# Patient Record
Sex: MALE | Race: WHITE | NOT HISPANIC OR LATINO | Employment: OTHER | URBAN - METROPOLITAN AREA
[De-identification: names, ages, dates, MRNs, and addresses within clinical notes are randomized per-mention and may not be internally consistent; named-entity substitution may affect disease eponyms.]

---

## 2019-10-14 PROBLEM — F17.200 TOBACCO DEPENDENCE: Status: ACTIVE | Noted: 2019-10-14

## 2019-10-14 PROBLEM — I10 HYPERTENSION: Status: ACTIVE | Noted: 2019-10-14

## 2019-10-14 PROBLEM — R11.10 NON-INTRACTABLE VOMITING: Status: ACTIVE | Noted: 2019-10-14

## 2019-10-14 PROBLEM — G44.89 OTHER HEADACHE SYNDROME: Status: ACTIVE | Noted: 2019-10-14

## 2019-10-14 PROBLEM — I10 HTN (HYPERTENSION), MALIGNANT: Status: ACTIVE | Noted: 2019-10-14

## 2019-10-15 PROBLEM — I16.0 HYPERTENSIVE URGENCY: Status: ACTIVE | Noted: 2019-10-14

## 2019-10-15 PROBLEM — R51.9 NONINTRACTABLE HEADACHE: Status: ACTIVE | Noted: 2019-10-15

## 2019-11-01 PROBLEM — R55 SYNCOPE: Status: ACTIVE | Noted: 2019-11-01

## 2021-04-16 ENCOUNTER — PATIENT MESSAGE (OUTPATIENT)
Dept: RESEARCH | Facility: HOSPITAL | Age: 61
End: 2021-04-16

## 2021-10-14 ENCOUNTER — IMMUNIZATION (OUTPATIENT)
Dept: FAMILY MEDICINE | Facility: CLINIC | Age: 61
End: 2021-10-14
Payer: MEDICAID

## 2021-10-14 DIAGNOSIS — Z23 NEED FOR VACCINATION: Primary | ICD-10-CM

## 2021-10-14 PROCEDURE — 0003A COVID-19, MRNA, LNP-S, PF, 30 MCG/0.3 ML DOSE VACCINE: CPT | Mod: PBBFAC | Performed by: FAMILY MEDICINE

## 2021-10-14 PROCEDURE — 91300 COVID-19, MRNA, LNP-S, PF, 30 MCG/0.3 ML DOSE VACCINE: CPT | Mod: PBBFAC,PO

## 2023-04-25 ENCOUNTER — CLINICAL SUPPORT (OUTPATIENT)
Dept: SMOKING CESSATION | Facility: CLINIC | Age: 63
End: 2023-04-25
Payer: COMMERCIAL

## 2023-04-25 DIAGNOSIS — F17.210 MODERATE SMOKER (20 OR LESS PER DAY): Primary | ICD-10-CM

## 2023-04-25 DIAGNOSIS — F17.200 NEEDS SMOKING CESSATION EDUCATION: ICD-10-CM

## 2023-04-25 PROCEDURE — 99404 PREV MED CNSL INDIV APPRX 60: CPT | Mod: S$GLB,,, | Performed by: INTERNAL MEDICINE

## 2023-04-25 PROCEDURE — 99404 PR PREVENT COUNSEL,INDIV,60 MIN: ICD-10-PCS | Mod: S$GLB,,, | Performed by: INTERNAL MEDICINE

## 2023-04-25 RX ORDER — VARENICLINE TARTRATE 1 MG/1
1 TABLET, FILM COATED ORAL 2 TIMES DAILY
Qty: 56 TABLET | Refills: 0 | Status: SHIPPED | OUTPATIENT
Start: 2023-04-25 | End: 2023-05-16 | Stop reason: SDUPTHER

## 2023-04-25 NOTE — Clinical Note
Patient seen for the tobacco cessation program. This is him first attempt to stop smoking through our program. He and his spouse, Ting are trying to stop smoking together. He is a cigarette smoker of 1 PPD X 45 years. He is motivated to quit the use of tobacco and has agreed to attend our 6 week tobacco cessation program.

## 2023-05-02 ENCOUNTER — CLINICAL SUPPORT (OUTPATIENT)
Dept: SMOKING CESSATION | Facility: CLINIC | Age: 63
End: 2023-05-02

## 2023-05-02 DIAGNOSIS — F17.210 MODERATE SMOKER (20 OR LESS PER DAY): Primary | ICD-10-CM

## 2023-05-02 PROCEDURE — 90853 GROUP PSYCHOTHERAPY: CPT | Mod: S$GLB,,, | Performed by: INTERNAL MEDICINE

## 2023-05-02 PROCEDURE — 90853 PR GROUP PSYCHOTHERAPY: ICD-10-PCS | Mod: S$GLB,,, | Performed by: INTERNAL MEDICINE

## 2023-05-03 NOTE — PROGRESS NOTES
Site: [facility]  Date:  5/3/2023  Clinical Status of Patient: Outpatient   Length of Service and Code: 60 minutes - 56782   Number in Attendance: 5  Group Activities/Focus of Group:  #3 orientation, client introductions, completion of TCRS (Tobacco Cessation Rating Scale) learned addiction model, cues/triggers, personal reasons for quitting, medications, goals, quit date    Target symptoms:  withdrawal and medication side effects             The following were rated moderate (3) to severe (4) on TCRS:       Moderate 3: none     Severe 4:   none  Patient's Response to Intervention: The patient denies any abnormal behavioral or mental changes at this time.     Progress Toward Goals and Other Mental Status Changes: Patient seen with his spouse, Ting for our group class. They are trying to stop smoking together. We discussed and reviewed strategies, controlling environment, cues, triggers, new goals set. Introduced high risk situations with preparation interventions, caffeine similarities with withdrawal issues of habit and nicotine, alcohol, understanding urges, cravings, stress and relaxation. Open discussion with intervention discussion. The patient remains on the prescribed tobacco cessation medication regimen of 1 mg Chantix BID without any negative side effects at this time. The patient denies any abnormal behavioral or mental changes at this time. The patient will continue with group therapy sessions and medication monitoring by CTTS. Prescribed medication management will be by physician.     Diagnosis: F17.210    Plan: The patient will continue with therapy sessions and medication regimen prescribed with management by physician or Cessation Clinic Provider. Patient will inform Smoking Clinic Cessation Counselor of symptoms as rated high on TCRS.    Return to Clinic: 1 week

## 2023-05-09 ENCOUNTER — CLINICAL SUPPORT (OUTPATIENT)
Dept: SMOKING CESSATION | Facility: CLINIC | Age: 63
End: 2023-05-09

## 2023-05-09 DIAGNOSIS — F17.210 LIGHT CIGARETTE SMOKER (1-9 CIGS/DAY): Primary | ICD-10-CM

## 2023-05-09 PROCEDURE — 90853 PR GROUP PSYCHOTHERAPY: ICD-10-PCS | Mod: S$GLB,,, | Performed by: INTERNAL MEDICINE

## 2023-05-09 PROCEDURE — 90853 GROUP PSYCHOTHERAPY: CPT | Mod: S$GLB,,, | Performed by: INTERNAL MEDICINE

## 2023-05-09 NOTE — Clinical Note
Patient reports decreasing cigarette smoking from a 1/2 pack per for 45 years to 5 cigarettes per day for 45 years to  He and his spouse, Ting are trying tot stop smoking together. We discussed and reviewed strategies, habitual behavior, high risks situations, understanding urges and cravings, stress and relaxation with open discussion and additional interventions, Introduced lapses, relapses, understanding them and analyzing the situation of a lapse, conflict issues that may be linked to a lapse.The patient remains on the prescribed tobacco cessation medication regimen of 1 mg Chantix BID without any negative side effects at this time. The patient denies any abnormal behavioral or mental changes at this time. The patient will continue with group therapy sessions and medication monitoring by CTTS. Prescribed medication management will be by physician.

## 2023-05-09 NOTE — PROGRESS NOTES
Site: [facility]  Date:  5/9/2023  Clinical Status of Patient: Outpatient   Length of Service and Code: 90 minutes - 59929   Number in Attendance: 3  CO Monitor Score: 00 pmm  Group Activities/Focus of Group:  orientation, client introductions, completion of TCRS (Tobacco Cessation Rating Scale) learned addiction model, cues/triggers, personal reasons for quitting, medications, goals, quit date    Target symptoms:  withdrawal and medication side effects             The following were rated moderate (3) to severe (4) on TCRS:       Moderate 3: none     Severe 4:   none  Patient's Response to Intervention: #5 Patient reports decreasing cigarette smoking from a 1/2 pack per for 45 years to 5 cigarettes per day for 45 years to  He and his spouse, Ting are trying tot stop smoking together. We discussed and reviewed strategies, habitual behavior, high risks situations, understanding urges and cravings, stress and relaxation with open discussion and additional interventions, Introduced lapses, relapses, understanding them and analyzing the situation of a lapse, conflict issues that may be linked to a lapse.The patient remains on the prescribed tobacco cessation medication regimen of 1 mg Chantix BID without any negative side effects at this time. The patient denies any abnormal behavioral or mental changes at this time. The patient will continue with group therapy sessions and medication monitoring by CTTS. Prescribed medication management will be by physician.     Progress Toward Goals and Other Mental Status Changes: The patient denies any abnormal behavioral or mental changes at this time.     Diagnosis: f17.210    Plan: The patient will continue with group therapy sessions and medication regimen prescribed with management by physician or Cessation Clinic Provider. Patient will inform Smoking Clinic Cessation Counselor of symptoms as rated high on TCRS.    Return to Clinic: 1 week

## 2023-05-16 ENCOUNTER — CLINICAL SUPPORT (OUTPATIENT)
Dept: SMOKING CESSATION | Facility: CLINIC | Age: 63
End: 2023-05-16

## 2023-05-16 DIAGNOSIS — F17.210 MODERATE SMOKER (20 OR LESS PER DAY): ICD-10-CM

## 2023-05-16 DIAGNOSIS — F17.210 CIGARETTE NICOTINE DEPENDENCE, UNCOMPLICATED: Primary | ICD-10-CM

## 2023-05-16 PROCEDURE — 90853 GROUP PSYCHOTHERAPY: CPT | Mod: S$GLB,,, | Performed by: INTERNAL MEDICINE

## 2023-05-16 PROCEDURE — 90853 PR GROUP PSYCHOTHERAPY: ICD-10-PCS | Mod: S$GLB,,, | Performed by: INTERNAL MEDICINE

## 2023-05-16 RX ORDER — VARENICLINE TARTRATE 1 MG/1
1 TABLET, FILM COATED ORAL 2 TIMES DAILY
Qty: 56 TABLET | Refills: 0 | Status: SHIPPED | OUTPATIENT
Start: 2023-05-16 | End: 2024-01-29

## 2023-05-16 NOTE — PROGRESS NOTES
Site: [facility]  Date:  5/16/2023  Clinical Status of Patient: Outpatient   Length of Service and Code: 60 minutes - 02319   Number in Attendance: 6  CO Monitor Score: 00 pmm  Group Activities/Focus of Group:  Orientation, client introductions, completion of TCRS (Tobacco Cessation Rating Scale) learned addiction model, cues/triggers, personal reasons for quitting, medications, goals, quit date.    Target symptoms:  withdrawal and medication side effects             The following were rated moderate (3) to severe (4) on TCRS:       Moderate 3: none     Severe 4:   none  Patient's Response to Intervention: #4 Patient reports being tobacco free since yesterday morning, May 15,2023. Patient and his spouse, Ting attended group meeting together in an attempt to stop smoking. His wife is a candidate for a kidney replacement and he will be her donor. We discussed and reviewed strategies, habitual behavior, stress, and high risk situations. Introduced stress with addition interventions, SOLVE, relaxation with interventions, nutrition, exercise, weight gain, and the importance of rewarding oneself for accomplishments toward becoming tobacco free. Open discussion of all items with interventions. The patient remains on the prescribed tobacco cessation medication regimen of 1 mg Chantix BID without any negative side effects at this time. The patient denies any abnormal behavioral or mental changes at this time. The patient will continue with group therapy sessions and medication monitoring by CTTS. Prescribed medication management will be by physician.     Progress Toward Goals and Other Mental Status Changes: The patient denies any abnormal behavioral or mental changes at this time.     Diagnosis: F17.210    Plan: The patient will continue with group therapy sessions and medication regimen prescribed with management by physician or Cessation Clinic Provider. Patient will inform Smoking Clinic Cessation Counselor of  symptoms as rated high on TCRS.    Return to Clinic: 1 week

## 2023-05-16 NOTE — Clinical Note
Patient reports being tobacco free since yesterday morning, May 15,2023. Patient and his spouse, Ting attended group meeting together in an attempt to stop smoking. His wife is a candidate for a kidney replacement and he will be her donor. We discussed and reviewed strategies, habitual behavior, stress, and high risk situations. Introduced stress with addition interventions, SOLVE, relaxation with interventions, nutrition, exercise, weight gain, and the importance of rewarding oneself for accomplishments toward becoming tobacco free. Open discussion of all items with interventions. The patient remains on the prescribed tobacco cessation medication regimen of 1 mg Chantix BID without any negative side effects at this time. The patient denies any abnormal behavioral or mental changes at this time. The patient will continue with group therapy sessions and medication monitoring by CTTS. Prescribed medication management will be by physician.

## 2023-05-23 ENCOUNTER — CLINICAL SUPPORT (OUTPATIENT)
Dept: SMOKING CESSATION | Facility: CLINIC | Age: 63
End: 2023-05-23

## 2023-05-23 DIAGNOSIS — F17.210 CIGARETTE NICOTINE DEPENDENCE, UNCOMPLICATED: Primary | ICD-10-CM

## 2023-05-23 PROCEDURE — 90853 GROUP PSYCHOTHERAPY: CPT | Mod: S$GLB,,, | Performed by: INTERNAL MEDICINE

## 2023-05-23 PROCEDURE — 90853 PR GROUP PSYCHOTHERAPY: ICD-10-PCS | Mod: S$GLB,,, | Performed by: INTERNAL MEDICINE

## 2023-05-23 NOTE — Clinical Note
Patient seen with his spouse, Ting at his side for smoking cessation. They have stopped smoking together. His quit date is 5/15/23. He has a history of smoking 1/2 a pack of cigarettes per day for 45 years. We discussed and reviewed strategies, habitual behavior, high risks situations, understanding urges and cravings, stress and relaxation with open discussion and additional interventions, Introduced lapses, relapses, understanding them and analyzing the situation of a lapse, conflict issues that may be linked to a lapse. The patient remains on the prescribed tobacco cessation medication regimen of 1 mg Chantix BID without any negative side effects at this time. The patient denies any abnormal behavioral or mental changes at this time. The patient will continue with group therapy sessions and medication monitoring by CTTS. Prescribed medication management will be by physician.

## 2023-05-23 NOTE — PROGRESS NOTES
Site: [facility]  Date:  5/23/2023  Clinical Status of Patient: Outpatient   Length of Service and Code: 60 minutes - 40204   Number in Attendance: 2  CO Monitor Score: 1 pmm  Group Activities/Focus of Group:  Orientation, client introductions, completion of TCRS (Tobacco Cessation Rating Scale) learned addiction model, cues/triggers, personal reasons for quitting, medications, goals, quit date.    Target symptoms:  withdrawal and medication side effects             The following were rated moderate (3) to severe (4) on TCRS:       Moderate 3: none     Severe 4:   none  Patient's Response to Intervention: #5 Patient seen with his spouse, Ting at his side for smoking cessation. They have stopped smoking together. His quit date is 5/15/23. He has a history of smoking 1/2 a pack of cigarettes per day for 45 years. We discussed and reviewed strategies, habitual behavior, high risks situations, understanding urges and cravings, stress and relaxation with open discussion and additional interventions, Introduced lapses, relapses, understanding them and analyzing the situation of a lapse, conflict issues that may be linked to a lapse. The patient remains on the prescribed tobacco cessation medication regimen of 1 mg Chantix BID without any negative side effects at this time. The patient denies any abnormal behavioral or mental changes at this time. The patient will continue with group therapy sessions and medication monitoring by CTTS. Prescribed medication management will be by physician.     Progress Toward Goals and Other Mental Status Changes: The patient denies any abnormal behavioral or mental changes at this time. The patient will continue with group therapy sessions and medication monitoring by CTTS. Prescribed medication management will be by physician.     Diagnosis: F17.210    Plan: The patient will continue with group therapy sessions and medication regimen prescribed with management by physician or  Cessation Clinic Provider. Patient will inform Smoking Clinic Cessation Counselor of symptoms as rated high on TCRS.    Return to Clinic: 1 week

## 2023-05-30 ENCOUNTER — CLINICAL SUPPORT (OUTPATIENT)
Dept: SMOKING CESSATION | Facility: CLINIC | Age: 63
End: 2023-05-30

## 2023-05-30 DIAGNOSIS — F17.210 CIGARETTE NICOTINE DEPENDENCE, UNCOMPLICATED: Primary | ICD-10-CM

## 2023-05-30 PROCEDURE — 90853 GROUP PSYCHOTHERAPY: CPT | Mod: S$GLB,,, | Performed by: INTERNAL MEDICINE

## 2023-05-30 PROCEDURE — 90853 PR GROUP PSYCHOTHERAPY: ICD-10-PCS | Mod: S$GLB,,, | Performed by: INTERNAL MEDICINE

## 2023-05-30 NOTE — PROGRESS NOTES
Individual Follow-Up Form    5/30/2023    Quit Date: 5/15/23    Clinical Status of Patient: Outpatient    Length of Service: 60 minutes    Continuing Medication: yes  Chantix    Other Medications: none     Target Symptoms: Withdrawal and medication side effects. The following were rated moderate (3) to severe (4) on TCRS:  Moderate (3): none  Severe (4): none    Comments: #6  This was a group meeting with two present for class. Patient reports remaining tobacco free since 5/15/23 after smoking a 1/2 pack of cigarettes per day for 45 years. He and his spouse, Ting have stopped smoking together. He is pleased with his progress and intends to never smoke again. We discussed and reviewed strategies, cues, triggers, high risk situations, lapses, relapses, diet, exercise, stress, relaxation, sleep, habitual behavior, and life style changes.The patient remains on the prescribed tobacco cessation medication regimen of 1 mg Chantix BID without any negative side effects at this time.The patient denies any abnormal behavioral or mental changes at this time. The patient will continue with group therapy sessions and medication monitoring by CTTS. Prescribed medication management will be by physician.     Diagnosis: F17.210    Next Visit: 1 week

## 2023-05-30 NOTE — Clinical Note
This was a group meeting with two present for class. Patient reports remaining tobacco free since 5/15/23 after smoking a 1/2 pack of cigarettes per day for 45 years. He and his spouse, Ting have stopped smoking together. He is pleased with his progress and intends to never smoke again. We discussed and reviewed strategies, cues, triggers, high risk situations, lapses, relapses, diet, exercise, stress, relaxation, sleep, habitual behavior, and life style changes.The patient remains on the prescribed tobacco cessation medication regimen of 1 mg Chantix BID without any negative side effects at this time.The patient denies any abnormal behavioral or mental changes at this time. The patient will continue with group therapy sessions and medication monitoring by CTTS. Prescribed medication management will be by physician.

## 2023-06-07 ENCOUNTER — CLINICAL SUPPORT (OUTPATIENT)
Dept: SMOKING CESSATION | Facility: CLINIC | Age: 63
End: 2023-06-07

## 2023-06-07 DIAGNOSIS — F17.210 CIGARETTE NICOTINE DEPENDENCE, UNCOMPLICATED: Primary | ICD-10-CM

## 2023-06-07 PROCEDURE — 99407 PR TOBACCO USE CESSATION INTENSIVE >10 MINUTES: ICD-10-PCS | Mod: S$GLB,,, | Performed by: INTERNAL MEDICINE

## 2023-06-07 PROCEDURE — 99407 BEHAV CHNG SMOKING > 10 MIN: CPT | Mod: S$GLB,,, | Performed by: INTERNAL MEDICINE

## 2023-07-11 ENCOUNTER — TELEPHONE (OUTPATIENT)
Dept: SMOKING CESSATION | Facility: CLINIC | Age: 63
End: 2023-07-11
Payer: MEDICAID

## 2023-07-17 ENCOUNTER — CLINICAL SUPPORT (OUTPATIENT)
Dept: SMOKING CESSATION | Facility: CLINIC | Age: 63
End: 2023-07-17

## 2023-07-17 DIAGNOSIS — F17.200 NICOTINE DEPENDENCE: Primary | ICD-10-CM

## 2023-07-17 PROCEDURE — 99407 PR TOBACCO USE CESSATION INTENSIVE >10 MINUTES: ICD-10-PCS | Mod: S$GLB,,,

## 2023-07-17 PROCEDURE — 99999 PR PBB SHADOW E&M-EST. PATIENT-LVL I: ICD-10-PCS | Mod: PBBFAC,,,

## 2023-07-17 PROCEDURE — 99999 PR PBB SHADOW E&M-EST. PATIENT-LVL I: CPT | Mod: PBBFAC,,,

## 2023-07-17 PROCEDURE — 99407 BEHAV CHNG SMOKING > 10 MIN: CPT | Mod: S$GLB,,,

## 2023-07-17 NOTE — PROGRESS NOTES
Spoke with patient today in regard to smoking cessation progress for 3 month telephone follow up, he states tobacco free. Commended patient on the accomplishment thus far. Patient states the current use of Chantix to help aid in his quit. Informed patient of benefit period, future follow ups, and contact information if any further help or support is needed. Will complete smart form for 3 month follow up on Quit attempt #1.

## 2023-10-20 ENCOUNTER — OFFICE VISIT (OUTPATIENT)
Dept: PULMONOLOGY | Facility: CLINIC | Age: 63
End: 2023-10-20
Payer: MEDICAID

## 2023-10-20 VITALS
DIASTOLIC BLOOD PRESSURE: 92 MMHG | BODY MASS INDEX: 24.58 KG/M2 | OXYGEN SATURATION: 97 % | WEIGHT: 191.5 LBS | SYSTOLIC BLOOD PRESSURE: 162 MMHG | HEART RATE: 63 BPM | HEIGHT: 74 IN

## 2023-10-20 DIAGNOSIS — J47.9 BRONCHIECTASIS WITHOUT COMPLICATION: ICD-10-CM

## 2023-10-20 DIAGNOSIS — G47.30 SLEEP APNEA, UNSPECIFIED TYPE: ICD-10-CM

## 2023-10-20 DIAGNOSIS — R05.9 COUGH, UNSPECIFIED TYPE: ICD-10-CM

## 2023-10-20 DIAGNOSIS — J44.89 COPD WITH ASTHMA: Primary | ICD-10-CM

## 2023-10-20 PROCEDURE — 99204 PR OFFICE/OUTPT VISIT, NEW, LEVL IV, 45-59 MIN: ICD-10-PCS | Mod: S$PBB,,, | Performed by: NURSE PRACTITIONER

## 2023-10-20 PROCEDURE — 1159F MED LIST DOCD IN RCRD: CPT | Mod: CPTII,,, | Performed by: NURSE PRACTITIONER

## 2023-10-20 PROCEDURE — 1160F RVW MEDS BY RX/DR IN RCRD: CPT | Mod: CPTII,,, | Performed by: NURSE PRACTITIONER

## 2023-10-20 PROCEDURE — 99999 PR PBB SHADOW E&M-NEW PATIENT-LVL IV: CPT | Mod: PBBFAC,,, | Performed by: NURSE PRACTITIONER

## 2023-10-20 PROCEDURE — 3080F DIAST BP >= 90 MM HG: CPT | Mod: CPTII,,, | Performed by: NURSE PRACTITIONER

## 2023-10-20 PROCEDURE — 3077F SYST BP >= 140 MM HG: CPT | Mod: CPTII,,, | Performed by: NURSE PRACTITIONER

## 2023-10-20 PROCEDURE — 1160F PR REVIEW ALL MEDS BY PRESCRIBER/CLIN PHARMACIST DOCUMENTED: ICD-10-PCS | Mod: CPTII,,, | Performed by: NURSE PRACTITIONER

## 2023-10-20 PROCEDURE — 3080F PR MOST RECENT DIASTOLIC BLOOD PRESSURE >= 90 MM HG: ICD-10-PCS | Mod: CPTII,,, | Performed by: NURSE PRACTITIONER

## 2023-10-20 PROCEDURE — 99999 PR PBB SHADOW E&M-NEW PATIENT-LVL IV: ICD-10-PCS | Mod: PBBFAC,,, | Performed by: NURSE PRACTITIONER

## 2023-10-20 PROCEDURE — 1159F PR MEDICATION LIST DOCUMENTED IN MEDICAL RECORD: ICD-10-PCS | Mod: CPTII,,, | Performed by: NURSE PRACTITIONER

## 2023-10-20 PROCEDURE — 3008F BODY MASS INDEX DOCD: CPT | Mod: CPTII,,, | Performed by: NURSE PRACTITIONER

## 2023-10-20 PROCEDURE — 3008F PR BODY MASS INDEX (BMI) DOCUMENTED: ICD-10-PCS | Mod: CPTII,,, | Performed by: NURSE PRACTITIONER

## 2023-10-20 PROCEDURE — 99204 OFFICE O/P NEW MOD 45 MIN: CPT | Mod: S$PBB,,, | Performed by: NURSE PRACTITIONER

## 2023-10-20 PROCEDURE — 3077F PR MOST RECENT SYSTOLIC BLOOD PRESSURE >= 140 MM HG: ICD-10-PCS | Mod: CPTII,,, | Performed by: NURSE PRACTITIONER

## 2023-10-20 PROCEDURE — 99204 OFFICE O/P NEW MOD 45 MIN: CPT | Mod: PBBFAC,PO | Performed by: NURSE PRACTITIONER

## 2023-10-20 RX ORDER — FLUTICASONE PROPIONATE AND SALMETEROL 500; 50 UG/1; UG/1
1 POWDER RESPIRATORY (INHALATION) 2 TIMES DAILY
Qty: 60 EACH | Refills: 11 | Status: SHIPPED | OUTPATIENT
Start: 2023-10-20 | End: 2024-01-24 | Stop reason: ALTCHOICE

## 2023-10-20 RX ORDER — BUPROPION HYDROCHLORIDE 150 MG/1
150 TABLET ORAL EVERY MORNING
COMMUNITY
Start: 2023-10-13 | End: 2024-01-29 | Stop reason: SDUPTHER

## 2023-10-20 RX ORDER — ALBUTEROL SULFATE 90 UG/1
2 AEROSOL, METERED RESPIRATORY (INHALATION) EVERY 4 HOURS PRN
Qty: 18 G | Refills: 11 | Status: SHIPPED | OUTPATIENT
Start: 2023-10-20

## 2023-10-20 RX ORDER — ATORVASTATIN CALCIUM 40 MG/1
40 TABLET, FILM COATED ORAL NIGHTLY
COMMUNITY
Start: 2023-09-26 | End: 2024-01-29 | Stop reason: SDUPTHER

## 2023-10-20 RX ORDER — DOXEPIN HYDROCHLORIDE 10 MG/1
10 CAPSULE ORAL NIGHTLY
COMMUNITY
Start: 2023-10-12 | End: 2024-01-29 | Stop reason: SDUPTHER

## 2023-10-20 RX ORDER — ALBUTEROL SULFATE 0.83 MG/ML
2.5 SOLUTION RESPIRATORY (INHALATION) EVERY 6 HOURS PRN
Qty: 120 ML | Refills: 5 | Status: SHIPPED | OUTPATIENT
Start: 2023-10-20 | End: 2024-01-29

## 2023-10-20 RX ORDER — AMLODIPINE BESYLATE 10 MG/1
10 TABLET ORAL
COMMUNITY
Start: 2023-10-17 | End: 2024-01-29 | Stop reason: SDUPTHER

## 2023-10-20 NOTE — PROGRESS NOTES
"10/20/2023    Rusty Carlos  New Patient Consult    Chief Complaint   Patient presents with    COPD       HPI: 10/20/2023- referred by PCP Nadiya, previously [facility]; had an abnormal PFT that showed Mild COPD. Treated with metered dose inhaler.   Complaint of shortness of breath, onset 2 years, worsened in past 2 years, worsens with exertion such as walking fast, associated with chest tightness  Complaint of cough- onset years, daily complaint, worse in mornings, quarter size yellow mucous. Has nocturnal coughing occasionally 4 x weekly.     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      No past surgical history on file.  Social History     Tobacco Use    Smoking status: Former     Types: Cigarettes    Smokeless tobacco: Never     No family history on file.  Review of patient's allergies indicates:  No Known Allergies  I have reviewed past medical, family, and social history. I have reviewed previous nurse notes.        Performance Status:The patient's activity level is housebound activities.      Review of Systems:  a review of eleven systems covering constitutional, Eye, HEENT, Psych, Respiratory, Cardiac, GI, , Musculoskeletal, Endocrine, Dermatologic was negative except for pertinent findings as listed ABOVE and below: pertinent positive as above, rest is good  Cough  Wheeze  Chest tightness  fatigue           Exam:Comprehensive exam done. BP (!) 162/92 (BP Location: Right arm, Patient Position: Sitting, BP Method: Medium (Automatic))   Pulse 63   Ht 6' 2" (1.88 m)   Wt 86.8 kg (191 lb 7.5 oz)   SpO2 97% Comment: on room air at rest  BMI 24.58 kg/m²   Exam included Vitals as " listed, and patient's appearance and affect and alertness and mood, oral exam for yeast and hygiene and pharynx lesions and Mallapatti (M) score, neck with inspection for jvd and masses and thyroid abnormalities and lymph nodes (supraclavicular and infraclavicular nodes and axillary also examined and noted if abn), chest exam included symmetry and effort and fremitus and percussion and auscultation, cardiac exam included rhythm and gallops and murmur and rubs and jvd and edema, abdominal exam for mass and hepatosplenomegaly and tenderness and hernias and bowel sounds, Musculoskeletal exam with muscle tone and posture and mobility/gait and  strength, and skin for rashes and cyanosis and pallor and turgor, extremity for clubbing.  Findings were normal except for pertinent findings listed below:   Breath sounds clear  M2      Radiographs (ct chest and cxr) reviewed: reviewed Echo Chest x-ray  patient imaging studies reviewed and interpreted independently. My personal interpretation of most resent images include:        Transthoracic echo (TTE) complete 11/4/19   The estimated PA systolic pressure is 18 mm Hg   The estimated ejection fraction is 60%     X-Ray Chest PA And Lateral  06/15/2022 chest x-ray is clear        Labs: Patients labs reviewed including CBC and CMP   Latest Reference Range & Units 01/04/21 08:20   WBC 3.90 - 12.70 K/uL 8.20   RBC 4.60 - 6.20 M/uL 5.42   Hemoglobin 14.0 - 18.0 g/dL 16.3   Hematocrit 40.0 - 54.0 % 47.2        Latest Reference Range & Units 10/15/19 03:16 10/15/19 23:06 11/01/19 18:10 11/02/19 04:39 11/25/19 14:29 01/04/21 08:20   Eos # 0.0 - 0.5 K/uL 0.1 0.2 0.1 0.1 0.1 0.1        Latest Reference Range & Units 11/01/19 17:25 11/02/19 04:39 11/25/19 14:29 01/04/21 08:20   CO2 23 - 29 mmol/L 31 (H) 29 30 (H) 26   (H): Data is abnormally high      PFT results reviewed  Pulmonary Functions Testing Results:  PFT 6/22/2023 FEV1 3.10 L 81% Mild obstruction  FEV1/FVC 6767 84%  TLC  91%  DLC0 106%      Plan:  Clinical impression is resonably certain and repeated evaluation prn +/- follow up will be needed as below.    Rusty was seen today for copd.    Diagnoses and all orders for this visit:    COPD with asthma  -     fluticasone-umeclidin-vilanter (TRELEGY ELLIPTA) 100-62.5-25 mcg DsDv; Inhale 1 puff into the lungs once daily.  -     fluticasone-salmeterol diskus inhaler 500-50 mcg; Inhale 1 puff into the lungs 2 (two) times daily. Controller  -     NEBULIZER FOR HOME USE  -     albuterol (PROVENTIL) 2.5 mg /3 mL (0.083 %) nebulizer solution; Take 3 mLs (2.5 mg total) by nebulization every 6 (six) hours as needed for Wheezing. Rescue  -     CT Chest Without Contrast; Future  -     Complete PFT with bronchodilator; Future  -     Six Minute Walk Test to qualify for Home Oxygen; Future  -     albuterol (VENTOLIN HFA) 90 mcg/actuation inhaler; Inhale 2 puffs into the lungs every 4 (four) hours as needed for Shortness of Breath. Rescue  -     Basic Metabolic Panel; Future    Bronchiectasis without complication  -     fluticasone-umeclidin-vilanter (TRELEGY ELLIPTA) 100-62.5-25 mcg DsDv; Inhale 1 puff into the lungs once daily.  -     fluticasone-salmeterol diskus inhaler 500-50 mcg; Inhale 1 puff into the lungs 2 (two) times daily. Controller  -     NEBULIZER FOR HOME USE  -     albuterol (PROVENTIL) 2.5 mg /3 mL (0.083 %) nebulizer solution; Take 3 mLs (2.5 mg total) by nebulization every 6 (six) hours as needed for Wheezing. Rescue  -     CT Chest Without Contrast; Future  -     Complete PFT with bronchodilator; Future  -     Six Minute Walk Test to qualify for Home Oxygen; Future  -     albuterol (VENTOLIN HFA) 90 mcg/actuation inhaler; Inhale 2 puffs into the lungs every 4 (four) hours as needed for Shortness of Breath. Rescue  -     CBC auto differential; Future  -     IGE; Future  -     Basic Metabolic Panel; Future    Sleep apnea, unspecified type  Comments:  - discuss again at next  visit    Cough, unspecified type  -     CT Chest Without Contrast; Future  -     albuterol (VENTOLIN HFA) 90 mcg/actuation inhaler; Inhale 2 puffs into the lungs every 4 (four) hours as needed for Shortness of Breath. Rescue          Follow up in about 3 months (around 1/20/2024), or if symptoms worsen or fail to improve.            Discussed with patient above for education the following:      Patient Instructions   Starting new medication  Trelegy 100 1 puff once a day every day, then start Advair after Trelegy is completed  rinse mouth after using due to risk for thrush if mouth or tongue has white sores contact clinic    Albuterol Inhaler 1-2 puffs every 4 hours, for cough or shortness of breath    CT of chest now    Blood work and lung function testing before next visit

## 2023-10-20 NOTE — PATIENT INSTRUCTIONS
Starting new medication  Trelegy 100 1 puff once a day every day, then start Advair after Trelegy is completed  rinse mouth after using due to risk for thrush if mouth or tongue has white sores contact clinic    Albuterol Inhaler 1-2 puffs every 4 hours, for cough or shortness of breath    CT of chest now    Blood work and lung function testing before next visit

## 2023-10-26 ENCOUNTER — CLINICAL SUPPORT (OUTPATIENT)
Dept: SMOKING CESSATION | Facility: CLINIC | Age: 63
End: 2023-10-26

## 2023-10-26 DIAGNOSIS — F17.200 NICOTINE DEPENDENCE: Primary | ICD-10-CM

## 2023-10-26 PROCEDURE — 99407 PR TOBACCO USE CESSATION INTENSIVE >10 MINUTES: ICD-10-PCS | Mod: S$GLB,,,

## 2023-10-26 PROCEDURE — 99407 BEHAV CHNG SMOKING > 10 MIN: CPT | Mod: S$GLB,,,

## 2023-10-26 PROCEDURE — 99999 PR PBB SHADOW E&M-EST. PATIENT-LVL I: CPT | Mod: PBBFAC,,,

## 2023-10-26 PROCEDURE — 99999 PR PBB SHADOW E&M-EST. PATIENT-LVL I: ICD-10-PCS | Mod: PBBFAC,,,

## 2023-10-30 NOTE — PROGRESS NOTES
Spoke with patient's wife today in regard to smoking cessation progress for 6 month telephone follow up, she states he is tobacco free. Commended patient on the accomplishment thus far. Informed patient's wife of benefit period, future follow ups, and contact information if any further help or support is needed. Will complete smart form for 6 month follow up on Quit attempt #1.

## 2023-11-28 ENCOUNTER — TELEPHONE (OUTPATIENT)
Dept: FAMILY MEDICINE | Facility: CLINIC | Age: 63
End: 2023-11-28
Payer: MEDICAID

## 2023-11-28 NOTE — TELEPHONE ENCOUNTER
----- Message from Yefri Silva sent at 11/28/2023  8:17 AM CST -----  Type:  Sooner Appointment Request    Caller is requesting a sooner appointment.  Caller declined first available appointment listed below.  Caller will not accept being placed on the waitlist and is requesting a message be sent to doctor.    Name of Caller:  pt  When is the first available appointment?  None for NP--please call and advise  Symptoms:  est care/check up  Would the patient rather a call back or a response via [facility]? call  Best Call Back Number:  [phone] (home)     Additional Information:  thank you

## 2023-11-29 ENCOUNTER — TELEPHONE (OUTPATIENT)
Dept: FAMILY MEDICINE | Facility: CLINIC | Age: 63
End: 2023-11-29
Payer: MEDICAID

## 2023-11-29 NOTE — TELEPHONE ENCOUNTER
----- Message from Ladi Sandoval sent at 11/28/2023  4:30 PM CST -----  Type:  Patient Returning Call    Who Called:  pt  Who Left Message for Patient:  Maddy  Does the patient know what this is regarding?:  yes/an appt  Best Call Back Number:   [phone]  Additional Information:  pl call bk and advise thanks

## 2024-01-24 ENCOUNTER — HOSPITAL ENCOUNTER (OUTPATIENT)
Dept: PULMONOLOGY | Facility: HOSPITAL | Age: 64
Discharge: HOME | End: 2024-01-24
Attending: NURSE PRACTITIONER
Payer: MEDICAID

## 2024-01-24 ENCOUNTER — OFFICE VISIT (OUTPATIENT)
Dept: PULMONOLOGY | Facility: CLINIC | Age: 64
End: 2024-01-24
Payer: MEDICAID

## 2024-01-24 VITALS
SYSTOLIC BLOOD PRESSURE: 133 MMHG | HEIGHT: 74 IN | DIASTOLIC BLOOD PRESSURE: 77 MMHG | WEIGHT: 208.13 LBS | OXYGEN SATURATION: 96 % | HEART RATE: 70 BPM | BODY MASS INDEX: 26.71 KG/M2

## 2024-01-24 DIAGNOSIS — J47.9 BRONCHIECTASIS WITHOUT COMPLICATION: ICD-10-CM

## 2024-01-24 DIAGNOSIS — J44.89 COPD WITH ASTHMA: ICD-10-CM

## 2024-01-24 DIAGNOSIS — F17.200 TOBACCO USE DISORDER: ICD-10-CM

## 2024-01-24 DIAGNOSIS — J44.89 COPD WITH ASTHMA: Primary | ICD-10-CM

## 2024-01-24 DIAGNOSIS — Z87.891 HISTORY OF NICOTINE DEPENDENCE: ICD-10-CM

## 2024-01-24 LAB
DLCO SINGLE BREATH LLN: 25.01
DLCO SINGLE BREATH PRE REF: 68.5 %
DLCO SINGLE BREATH REF: 31.93
DLCOC SBVA LLN: 2.97
DLCOC SBVA REF: 4.02
DLCOC SINGLE BREATH LLN: 25.01
DLCOC SINGLE BREATH REF: 31.93
DLCOVA LLN: 2.97
DLCOVA PRE REF: 90.2 %
DLCOVA PRE: 3.63 ML/(MIN*MMHG*L) (ref 2.97–5.08)
DLCOVA REF: 4.02
ERV LLN: -16448.74
ERV PRE REF: 65.9 %
ERV REF: 1.26
FEF 25 75 CHG: 25.9 %
FEF 25 75 LLN: 1.45
FEF 25 75 POST REF: 76.4 %
FEF 25 75 PRE REF: 60.7 %
FEF 25 75 REF: 3.07
FET100 CHG: 7.5 %
FEV1 CHG: 12.3 %
FEV1 FVC CHG: 5.3 %
FEV1 FVC LLN: 64
FEV1 FVC POST REF: 95 %
FEV1 FVC PRE REF: 90.2 %
FEV1 FVC REF: 76
FEV1 LLN: 2.91
FEV1 POST REF: 84.8 %
FEV1 PRE REF: 75.5 %
FEV1 REF: 3.92
FRCPLETH LLN: 2.89
FRCPLETH PREREF: 86.6 %
FRCPLETH REF: 3.88
FVC CHG: 6.7 %
FVC LLN: 3.9
FVC POST REF: 88.9 %
FVC PRE REF: 83.3 %
FVC REF: 5.16
IVC PRE: 4.32 L (ref 3.9–6.43)
IVC SINGLE BREATH LLN: 3.9
IVC SINGLE BREATH PRE REF: 83.9 %
IVC SINGLE BREATH REF: 5.16
MVV LLN: 128
MVV PRE REF: 75.3 %
MVV REF: 151
PEF CHG: -3.4 %
PEF LLN: 7.33
PEF POST REF: 86.6 %
PEF PRE REF: 89.6 %
PEF REF: 9.92
POST FEF 25 75: 2.34 L/S (ref 1.45–5.29)
POST FET 100: 11.54 SEC
POST FEV1 FVC: 72.44 % (ref 63.94–87.08)
POST FEV1: 3.32 L (ref 2.91–4.86)
POST FVC: 4.59 L (ref 3.9–6.43)
POST PEF: 8.59 L/S (ref 7.33–12.51)
PRE DLCO: 21.86 ML/(MIN*MMHG) (ref 25.01–38.86)
PRE ERV: 0.83 L (ref -16448.74–16451.26)
PRE FEF 25 75: 1.86 L/S (ref 1.45–5.29)
PRE FET 100: 10.74 SEC
PRE FEV1 FVC: 68.82 % (ref 63.94–87.08)
PRE FEV1: 2.96 L (ref 2.91–4.86)
PRE FRC PL: 3.36 L (ref 2.89–4.86)
PRE FVC: 4.3 L (ref 3.9–6.43)
PRE MVV: 113.42 L/MIN (ref 128.02–173.2)
PRE PEF: 8.89 L/S (ref 7.33–12.51)
PRE RV: 2.53 L (ref 1.94–3.29)
PRE TLC: 6.83 L (ref 6.79–9.09)
RAW LLN: 3.06
RAW PRE REF: 148.4 %
RAW PRE: 4.54 CMH2O*S/L (ref 3.06–3.06)
RAW REF: 3.06
RV LLN: 1.94
RV PRE REF: 96.6 %
RV REF: 2.62
RVTLC LLN: 30
RVTLC PRE REF: 96.2 %
RVTLC PRE: 37.05 % (ref 29.55–47.51)
RVTLC REF: 39
TLC LLN: 6.79
TLC PRE REF: 86 %
TLC REF: 7.94
VA PRE: 6.03 L (ref 7.79–7.79)
VA SINGLE BREATH LLN: 7.79
VA SINGLE BREATH PRE REF: 77.4 %
VA SINGLE BREATH REF: 7.79
VC LLN: 3.9
VC PRE REF: 83.3 %
VC PRE: 4.3 L (ref 3.9–6.43)
VC REF: 5.16

## 2024-01-24 PROCEDURE — 94618 PULMONARY STRESS TESTING: CPT

## 2024-01-24 PROCEDURE — 94726 PLETHYSMOGRAPHY LUNG VOLUMES: CPT

## 2024-01-24 PROCEDURE — 94618 PULMONARY STRESS TESTING: CPT | Mod: 26,,, | Performed by: INTERNAL MEDICINE

## 2024-01-24 PROCEDURE — 3008F BODY MASS INDEX DOCD: CPT | Mod: CPTII,,, | Performed by: NURSE PRACTITIONER

## 2024-01-24 PROCEDURE — 1159F MED LIST DOCD IN RCRD: CPT | Mod: CPTII,,, | Performed by: NURSE PRACTITIONER

## 2024-01-24 PROCEDURE — 99999 PR PBB SHADOW E&M-EST. PATIENT-LVL IV: CPT | Mod: PBBFAC,,, | Performed by: NURSE PRACTITIONER

## 2024-01-24 PROCEDURE — 94729 DIFFUSING CAPACITY: CPT

## 2024-01-24 PROCEDURE — 99213 OFFICE O/P EST LOW 20 MIN: CPT | Mod: S$PBB,25,, | Performed by: NURSE PRACTITIONER

## 2024-01-24 PROCEDURE — 94060 EVALUATION OF WHEEZING: CPT

## 2024-01-24 PROCEDURE — 3078F DIAST BP <80 MM HG: CPT | Mod: CPTII,,, | Performed by: NURSE PRACTITIONER

## 2024-01-24 PROCEDURE — 94729 DIFFUSING CAPACITY: CPT | Mod: 26,,, | Performed by: INTERNAL MEDICINE

## 2024-01-24 PROCEDURE — 3075F SYST BP GE 130 - 139MM HG: CPT | Mod: CPTII,,, | Performed by: NURSE PRACTITIONER

## 2024-01-24 PROCEDURE — 99214 OFFICE O/P EST MOD 30 MIN: CPT | Mod: PBBFAC,PO | Performed by: NURSE PRACTITIONER

## 2024-01-24 PROCEDURE — 94060 EVALUATION OF WHEEZING: CPT | Mod: 26,59,, | Performed by: INTERNAL MEDICINE

## 2024-01-24 PROCEDURE — 94726 PLETHYSMOGRAPHY LUNG VOLUMES: CPT | Mod: 26,,, | Performed by: INTERNAL MEDICINE

## 2024-01-24 PROCEDURE — 1160F RVW MEDS BY RX/DR IN RCRD: CPT | Mod: CPTII,,, | Performed by: NURSE PRACTITIONER

## 2024-01-24 RX ORDER — ALBUTEROL SULFATE 2.5 MG/.5ML
SOLUTION RESPIRATORY (INHALATION)
Status: DISCONTINUED
Start: 2024-01-24 | End: 2024-01-24 | Stop reason: HOSPADM

## 2024-01-24 RX ORDER — PNEUMOCOCCAL VACCINE POLYVALENT 25; 25; 25; 25; 25; 25; 25; 25; 25; 25; 25; 25; 25; 25; 25; 25; 25; 25; 25; 25; 25; 25; 25 UG/.5ML; UG/.5ML; UG/.5ML; UG/.5ML; UG/.5ML; UG/.5ML; UG/.5ML; UG/.5ML; UG/.5ML; UG/.5ML; UG/.5ML; UG/.5ML; UG/.5ML; UG/.5ML; UG/.5ML; UG/.5ML; UG/.5ML; UG/.5ML; UG/.5ML; UG/.5ML; UG/.5ML; UG/.5ML; UG/.5ML
0.5 INJECTION, SOLUTION INTRAMUSCULAR; SUBCUTANEOUS
COMMUNITY
End: 2024-01-29

## 2024-01-24 NOTE — PATIENT INSTRUCTIONS
-Continue Trelegy once a day for maintenance therapy. Rinse mouth after using due to risk for thrush if mouth or tongue has white sores contact clinic    -Continue albuterol for rescue therapy.    -Obtain CT Chest for lung cancer screening     -Great job quitting cigarettes!!

## 2024-01-24 NOTE — PROGRESS NOTES
2024    Rusty aCrlos  New Patient Consult    Chief Complaint   Patient presents with    3m f/u    medication refills       HPI:   2024 He reports improvement in shortness of breath.  He feels his endurance and shortness of breath has improved significantly. He is able to work all day without any shortness of breath. He has intermittently wheezing. He is taking the Advair diskus and trelegy inhaler. He likes the Trelegy more than the advair for maintenance therapy. He is taking albuterol inhaler for rescue. He is now 5 months of cigarette.    10/20/2023- referred by PCP Nadiya, [facility]; had an abnormal PFT that showed Mild COPD. Treated with metered dose inhaler.   Complaint of shortness of breath, onset 2 years, worsened in past 2 years, worsens with exertion such as walking fast, associated with chest tightness  Complaint of cough- onset years, daily complaint, worse in mornings, quarter size yellow mucous. Has nocturnal coughing occasionally 4 x weekly.     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  Social History     Tobacco Use    Smoking status: Former     Current packs/day: 0.00     Average packs/day: 0.5 packs/day for 45.0 years (22.5 ttl pk-yrs)     Types: Cigarettes     Start date: 5/15/1978     Quit date: 5/15/2023     Years since quittin.6    Smokeless tobacco: Never    Tobacco comments:     23 Active participant with smoking cessation.  5/15/23 QUIT SMOKING. 23 CO Monitor Score:1 pmm.   Substance Use Topics    Alcohol use: Yes     Comment: 1 beer every 2 days  "   Drug use: Yes     Types: Marijuana     Family History   Problem Relation Age of Onset    Heart disease Mother      Review of patient's allergies indicates:   Allergen Reactions    Lavender Hives and Rash     I have reviewed past medical, family, and social history. I have reviewed previous nurse notes.        Performance Status:The patient's activity level is housebound activities.      Review of Systems:  a review of eleven systems covering constitutional, Eye, HEENT, Psych, Respiratory, Cardiac, GI, , Musculoskeletal, Endocrine, Dermatologic was negative except for pertinent findings as listed ABOVE and below: pertinent positive as above, rest is good  Cough  Wheeze  Chest tightness  fatigue           Exam:Comprehensive exam done. /77 (BP Location: Right arm, Patient Position: Sitting, BP Method: Medium (Automatic))   Pulse 70   Ht 6' 2" (1.88 m)   Wt 94.4 kg (208 lb 1.8 oz)   SpO2 96% Comment: on room air at rest  BMI 26.72 kg/m²   Exam included Vitals as listed, and patient's appearance and affect and alertness and mood, oral exam for yeast and hygiene and pharynx lesions and Mallapatti (M) score, neck with inspection for jvd and masses and thyroid abnormalities and lymph nodes (supraclavicular and infraclavicular nodes and axillary also examined and noted if abn), chest exam included symmetry and effort and fremitus and percussion and auscultation, cardiac exam included rhythm and gallops and murmur and rubs and jvd and edema, abdominal exam for mass and hepatosplenomegaly and tenderness and hernias and bowel sounds, Musculoskeletal exam with muscle tone and posture and mobility/gait and  strength, and skin for rashes and cyanosis and pallor and turgor, extremity for clubbing.  Findings were normal except for pertinent findings listed below:   Breath sounds clear  M2      Radiographs (ct chest and cxr) reviewed: reviewed Echo Chest x-ray  patient imaging studies reviewed and interpreted " independently. My personal interpretation of most resent images include:        Transthoracic echo (TTE) complete 11/4/19   The estimated PA systolic pressure is 18 mm Hg   The estimated ejection fraction is 60%     X-Ray Chest PA And Lateral  06/15/2022 chest x-ray is clear        Labs: Patients labs reviewed including CBC and CMP   Latest Reference Range & Units 01/04/21 08:20   WBC 3.90 - 12.70 K/uL 8.20   RBC 4.60 - 6.20 M/uL 5.42   Hemoglobin 14.0 - 18.0 g/dL 16.3   Hematocrit 40.0 - 54.0 % 47.2      Latest Reference Range & Units Most Recent 11/02/19 04:39 11/25/19 14:29 01/04/21 08:20 01/24/24 10:32   Eos # 0.0 - 0.5 K/uL 0.1  1/24/24 10:32 0.1 0.1 0.1 0.1        Latest Reference Range & Units 11/01/19 17:25 11/02/19 04:39 11/25/19 14:29 01/04/21 08:20   CO2 23 - 29 mmol/L 31 (H) 29 30 (H) 26   (H): Data is abnormally high      PFT results reviewed  Pulmonary Functions Testing Results:  PFT 6/22/2023 FEV1 3.10 L 81% Mild obstruction  FEV1/FVC 6767 84%  TLC 91%  DLC0 106%    01/2024 FEV 2.96 75.5% mild obstruction  FEV1/FVC 69 90.2%  TLC 86%  DLCO 68.5%       Plan:  Clinical impression is resonably certain and repeated evaluation prn +/- follow up will be needed as below.    Rusty was seen today for 3m f/u and medication refills.    Diagnoses and all orders for this visit:    COPD with asthma  -     fluticasone-umeclidin-vilanter (TRELEGY ELLIPTA) 100-62.5-25 mcg DsDv; Inhale 1 puff into the lungs once daily.    Bronchiectasis without complication  -     fluticasone-umeclidin-vilanter (TRELEGY ELLIPTA) 100-62.5-25 mcg DsDv; Inhale 1 puff into the lungs once daily.            Follow up in about 3 months (around 4/24/2024).            Discussed with patient above for education the following:      Patient Instructions   -Continue Trelegy once a day for maintenance therapy. Rinse mouth after using due to risk for thrush if mouth or tongue has white sores contact clinic    -Continue albuterol for rescue  therapy.    -Obtain CT Chest for lung cancer screening     -Great job quitting cigarettes!!

## 2024-01-26 DIAGNOSIS — J47.9 BRONCHIECTASIS WITHOUT COMPLICATION: ICD-10-CM

## 2024-01-26 DIAGNOSIS — J44.89 COPD WITH ASTHMA: ICD-10-CM

## 2024-01-29 ENCOUNTER — OFFICE VISIT (OUTPATIENT)
Dept: FAMILY MEDICINE | Facility: CLINIC | Age: 64
End: 2024-01-29
Payer: MEDICAID

## 2024-01-29 ENCOUNTER — LAB VISIT (OUTPATIENT)
Dept: LAB | Facility: HOSPITAL | Age: 64
End: 2024-01-29
Attending: FAMILY MEDICINE
Payer: MEDICAID

## 2024-01-29 ENCOUNTER — PATIENT MESSAGE (OUTPATIENT)
Dept: PSYCHIATRY | Facility: CLINIC | Age: 64
End: 2024-01-29
Payer: MEDICAID

## 2024-01-29 VITALS
BODY MASS INDEX: 26.2 KG/M2 | HEIGHT: 74 IN | DIASTOLIC BLOOD PRESSURE: 84 MMHG | HEART RATE: 67 BPM | OXYGEN SATURATION: 96 % | WEIGHT: 204.13 LBS | TEMPERATURE: 97 F | SYSTOLIC BLOOD PRESSURE: 134 MMHG

## 2024-01-29 DIAGNOSIS — E78.2 MIXED HYPERLIPIDEMIA: ICD-10-CM

## 2024-01-29 DIAGNOSIS — R73.09 ELEVATED RANDOM BLOOD GLUCOSE LEVEL: ICD-10-CM

## 2024-01-29 DIAGNOSIS — I10 PRIMARY HYPERTENSION: ICD-10-CM

## 2024-01-29 DIAGNOSIS — J44.89 COPD WITH ASTHMA: ICD-10-CM

## 2024-01-29 DIAGNOSIS — Z11.4 SCREENING FOR HIV (HUMAN IMMUNODEFICIENCY VIRUS): ICD-10-CM

## 2024-01-29 DIAGNOSIS — Z11.59 NEED FOR HEPATITIS C SCREENING TEST: ICD-10-CM

## 2024-01-29 DIAGNOSIS — F41.9 ANXIETY: ICD-10-CM

## 2024-01-29 DIAGNOSIS — Z12.5 SCREENING FOR PROSTATE CANCER: ICD-10-CM

## 2024-01-29 DIAGNOSIS — F32.A DEPRESSIVE DISORDER: ICD-10-CM

## 2024-01-29 DIAGNOSIS — I10 PRIMARY HYPERTENSION: Primary | ICD-10-CM

## 2024-01-29 PROBLEM — N40.0 BENIGN PROSTATIC HYPERPLASIA: Status: ACTIVE | Noted: 2024-01-29

## 2024-01-29 PROBLEM — E78.5 HYPERLIPIDEMIA: Status: ACTIVE | Noted: 2024-01-29

## 2024-01-29 PROBLEM — Z72.0 TOBACCO USER: Status: ACTIVE | Noted: 2024-01-29

## 2024-01-29 LAB
ALBUMIN SERPL BCP-MCNC: 4 G/DL (ref 3.5–5.2)
ALP SERPL-CCNC: 125 U/L (ref 55–135)
ALT SERPL W/O P-5'-P-CCNC: 47 U/L (ref 10–44)
ANION GAP SERPL CALC-SCNC: 6 MMOL/L (ref 8–16)
AST SERPL-CCNC: 31 U/L (ref 10–40)
BILIRUB SERPL-MCNC: 0.5 MG/DL (ref 0.1–1)
BUN SERPL-MCNC: 10 MG/DL (ref 8–23)
CALCIUM SERPL-MCNC: 9.3 MG/DL (ref 8.7–10.5)
CHLORIDE SERPL-SCNC: 103 MMOL/L (ref 95–110)
CHOLEST SERPL-MCNC: 181 MG/DL (ref 120–199)
CHOLEST/HDLC SERPL: 3 {RATIO} (ref 2–5)
CO2 SERPL-SCNC: 30 MMOL/L (ref 23–29)
COMPLEXED PSA SERPL-MCNC: 5.5 NG/ML (ref 0–4)
CREAT SERPL-MCNC: 1 MG/DL (ref 0.5–1.4)
EST. GFR  (NO RACE VARIABLE): >60 ML/MIN/1.73 M^2
ESTIMATED AVG GLUCOSE: 111 MG/DL (ref 68–131)
GLUCOSE SERPL-MCNC: 97 MG/DL (ref 70–110)
HAV IGM SERPL QL IA: NORMAL
HBA1C MFR BLD: 5.5 % (ref 4–5.6)
HBV CORE IGM SERPL QL IA: NORMAL
HBV SURFACE AG SERPL QL IA: NORMAL
HCV AB SERPL QL IA: NORMAL
HDLC SERPL-MCNC: 60 MG/DL (ref 40–75)
HDLC SERPL: 33.1 % (ref 20–50)
HIV 1+2 AB+HIV1 P24 AG SERPL QL IA: NORMAL
LDLC SERPL CALC-MCNC: 100.8 MG/DL (ref 63–159)
NONHDLC SERPL-MCNC: 121 MG/DL
POTASSIUM SERPL-SCNC: 4 MMOL/L (ref 3.5–5.1)
PROT SERPL-MCNC: 7.5 G/DL (ref 6–8.4)
SODIUM SERPL-SCNC: 139 MMOL/L (ref 136–145)
TRIGL SERPL-MCNC: 101 MG/DL (ref 30–150)

## 2024-01-29 PROCEDURE — 36415 COLL VENOUS BLD VENIPUNCTURE: CPT | Mod: PO | Performed by: FAMILY MEDICINE

## 2024-01-29 PROCEDURE — 99999 PR PBB SHADOW E&M-EST. PATIENT-LVL IV: CPT | Mod: PBBFAC,,, | Performed by: FAMILY MEDICINE

## 2024-01-29 PROCEDURE — 80074 ACUTE HEPATITIS PANEL: CPT | Performed by: FAMILY MEDICINE

## 2024-01-29 PROCEDURE — 3075F SYST BP GE 130 - 139MM HG: CPT | Mod: CPTII,,, | Performed by: FAMILY MEDICINE

## 2024-01-29 PROCEDURE — 1160F RVW MEDS BY RX/DR IN RCRD: CPT | Mod: CPTII,,, | Performed by: FAMILY MEDICINE

## 2024-01-29 PROCEDURE — 83036 HEMOGLOBIN GLYCOSYLATED A1C: CPT | Performed by: FAMILY MEDICINE

## 2024-01-29 PROCEDURE — 99214 OFFICE O/P EST MOD 30 MIN: CPT | Mod: PBBFAC,PO | Performed by: FAMILY MEDICINE

## 2024-01-29 PROCEDURE — 80061 LIPID PANEL: CPT | Performed by: FAMILY MEDICINE

## 2024-01-29 PROCEDURE — 80053 COMPREHEN METABOLIC PANEL: CPT | Performed by: FAMILY MEDICINE

## 2024-01-29 PROCEDURE — 87389 HIV-1 AG W/HIV-1&-2 AB AG IA: CPT | Performed by: FAMILY MEDICINE

## 2024-01-29 PROCEDURE — 3079F DIAST BP 80-89 MM HG: CPT | Mod: CPTII,,, | Performed by: FAMILY MEDICINE

## 2024-01-29 PROCEDURE — 1159F MED LIST DOCD IN RCRD: CPT | Mod: CPTII,,, | Performed by: FAMILY MEDICINE

## 2024-01-29 PROCEDURE — 3008F BODY MASS INDEX DOCD: CPT | Mod: CPTII,,, | Performed by: FAMILY MEDICINE

## 2024-01-29 PROCEDURE — 84153 ASSAY OF PSA TOTAL: CPT | Performed by: FAMILY MEDICINE

## 2024-01-29 PROCEDURE — 99204 OFFICE O/P NEW MOD 45 MIN: CPT | Mod: S$PBB,,, | Performed by: FAMILY MEDICINE

## 2024-01-29 RX ORDER — DOXEPIN HYDROCHLORIDE 10 MG/1
10 CAPSULE ORAL NIGHTLY
Qty: 90 CAPSULE | Refills: 3 | Status: SHIPPED | OUTPATIENT
Start: 2024-01-29 | End: 2025-01-28

## 2024-01-29 RX ORDER — AMLODIPINE BESYLATE 10 MG/1
10 TABLET ORAL DAILY
Qty: 90 TABLET | Refills: 3 | Status: SHIPPED | OUTPATIENT
Start: 2024-01-29 | End: 2025-01-28

## 2024-01-29 RX ORDER — BUPROPION HYDROCHLORIDE 150 MG/1
150 TABLET ORAL EVERY MORNING
Qty: 90 TABLET | Refills: 3 | Status: SHIPPED | OUTPATIENT
Start: 2024-01-29

## 2024-01-29 RX ORDER — ATORVASTATIN CALCIUM 40 MG/1
40 TABLET, FILM COATED ORAL NIGHTLY
Qty: 90 TABLET | Refills: 3 | Status: SHIPPED | OUTPATIENT
Start: 2024-01-29

## 2024-01-29 RX ORDER — AMLODIPINE BESYLATE 10 MG/1
10 TABLET ORAL DAILY
Qty: 90 TABLET | Refills: 3 | Status: SHIPPED | OUTPATIENT
Start: 2024-01-29 | End: 2024-01-29

## 2024-01-29 NOTE — PROGRESS NOTES
"Subjective:       Patient ID: Rusty Carlos is a 63 y.o. male.    Chief Complaint: Establish Care    Here today to establish care with a new PCP.   He was seeing Dr. Hearn.  [facility] in a few years)    Immunizations: Due Shingrix #2, COVID booster, RSV  Last Lab Work: 2024  Colon Ca screening: Colonoscopy 2-3 years ago at [place]  Prostate Ca Screening: PSA 2022    COPD:  seeing Pulm.  He is on Trelegy and Albuterol PRN  HTN:  He is on Norvasc.  BP is well controlled.  HLD: He is on statin therapy with Lipitor  MDD:  he is on Wellbutrin and Doxepin.  Was started on medication with Psych.  He has sig family stressors.  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      Review of Systems   Constitutional:  Negative for appetite change, fatigue and fever.   HENT:  Negative for congestion, sneezing and sore throat.    Respiratory:  Negative for cough, shortness of breath and wheezing.    Cardiovascular:  Negative for chest pain and palpitations.   Gastrointestinal:  Negative for abdominal pain, constipation, diarrhea, nausea and vomiting.   Genitourinary:  Negative for difficulty urinating, dysuria, frequency and hematuria.   Neurological:  Negative for dizziness, syncope, weakness and headaches.   Psychiatric/Behavioral:  Negative for agitation, behavioral problems and confusion. The patient is not nervous/anxious.        Objective:      Vitals:    01/29/24 1023   BP: 134/84   Pulse: 67   Temp: 97.4 °F (36.3 °C)   TempSrc: Oral   SpO2: 96%   Weight: 92.6 kg (204 lb 2.3 oz)   Height: 6' 2" (1.88 m)      Physical Exam  Constitutional:       General: He is not in acute distress.  Cardiovascular:      Rate and Rhythm: Normal rate and regular rhythm.      Heart sounds: Normal heart sounds. No murmur heard.  Pulmonary:      Effort: Pulmonary effort is normal. No respiratory distress.      Breath sounds: Normal breath sounds. No " wheezing, rhonchi or rales.   Musculoskeletal:         General: No swelling.   Skin:     General: Skin is warm and dry.   Neurological:      General: No focal deficit present.      Mental Status: He is alert.   Psychiatric:         Mood and Affect: Mood normal.         Behavior: Behavior normal.         Thought Content: Thought content normal.            Assessment:       1. Primary hypertension    2. COPD with asthma    3. Mixed hyperlipidemia    4. Elevated random blood glucose level    5. Screening for prostate cancer    6. Screening for HIV (human immunodeficiency virus)    7. Need for hepatitis C screening test    8. Depressive disorder    9. Anxiety        Plan:       Primary hypertension  -     Comprehensive Metabolic Panel; Future; Expected date: 01/29/2024    -     amLODIPine (NORVASC) 10 MG tablet; Take 1 tablet (10 mg total) by mouth once daily.  Dispense: 90 tablet; Refill: 3  Continue Norvasc at this time  COPD with asthma  Continue Trelegy as per Pulm  Mixed hyperlipidemia  -     Lipid Panel; Future; Expected date: 01/29/2024  -     atorvastatin (LIPITOR) 40 MG tablet; Take 1 tablet (40 mg total) by mouth every evening.  Dispense: 90 tablet; Refill: 3  Continue Lipitor.  Recheck lipids  Elevated random blood glucose level  -     Hemoglobin A1C; Future; Expected date: 01/29/2024    Screening for prostate cancer  -     PSA, Screening; Future; Expected date: 01/29/2024    Screening for HIV (human immunodeficiency virus)  -     HIV 1/2 Ag/Ab (4th Gen); Future; Expected date: 01/29/2024    Need for hepatitis C screening test  -     Hepatitis Panel, Acute; Future; Expected date: 01/29/2024    Depressive disorder  -     buPROPion (WELLBUTRIN XL) 150 MG TB24 tablet; Take 1 tablet (150 mg total) by mouth every morning.  Dispense: 90 tablet; Refill: 3  -     doxepin (SINEQUAN) 10 MG capsule; Take 1 capsule (10 mg total) by mouth every evening.  Dispense: 90 capsule; Refill: 3  -     Ambulatory referral/consult  to Psychiatry; Future; Expected date: 02/05/2024    Anxiety  -     buPROPion (WELLBUTRIN XL) 150 MG TB24 tablet; Take 1 tablet (150 mg total) by mouth every morning.  Dispense: 90 tablet; Refill: 3  -     doxepin (SINEQUAN) 10 MG capsule; Take 1 capsule (10 mg total) by mouth every evening.  Dispense: 90 capsule; Refill: 3  -     Ambulatory referral/consult to Psychiatry; Future; Expected date: 02/05/2024    Continue medications for now.  May do well to have a full psych evaluation due to the issues he had with his old PCP.  Referral made today.      Medication List with Changes/Refills   Current Medications    ALBUTEROL (VENTOLIN HFA) 90 MCG/ACTUATION INHALER    Inhale 2 puffs into the lungs every 4 (four) hours as needed for Shortness of Breath. Rescue    ASPIRIN (ECOTRIN) 81 MG EC TABLET    Take 1 tablet (81 mg total) by mouth once daily.    FLUTICASONE-UMECLIDIN-VILANTER (TRELEGY ELLIPTA) 100-62.5-25 MCG DSDV    Inhale 1 puff into the lungs once daily.   Changed and/or Refilled Medications    Modified Medication Previous Medication    AMLODIPINE (NORVASC) 10 MG TABLET amLODIPine (NORVASC) 10 MG tablet       Take 1 tablet (10 mg total) by mouth once daily.    Take 10 mg by mouth.    ATORVASTATIN (LIPITOR) 40 MG TABLET atorvastatin (LIPITOR) 40 MG tablet       Take 1 tablet (40 mg total) by mouth every evening.    Take 40 mg by mouth every evening.    BUPROPION (WELLBUTRIN XL) 150 MG TB24 TABLET buPROPion (WELLBUTRIN XL) 150 MG TB24 tablet       Take 1 tablet (150 mg total) by mouth every morning.    Take 150 mg by mouth every morning.    DOXEPIN (SINEQUAN) 10 MG CAPSULE doxepin (SINEQUAN) 10 MG capsule       Take 1 capsule (10 mg total) by mouth every evening.    Take 10 mg by mouth every evening.   Discontinued Medications    ALBUTEROL (PROVENTIL) 2.5 MG /3 ML (0.083 %) NEBULIZER SOLUTION    Take 3 mLs (2.5 mg total) by nebulization every 6 (six) hours as needed for Wheezing. Rescue    AMLODIPINE (NORVASC) 10 MG  TABLET    Take 10 mg by mouth.    ATORVASTATIN (LIPITOR) 40 MG TABLET    Take 40 mg by mouth every evening.    BUPROPION (WELLBUTRIN XL) 150 MG TB24 TABLET    Take 150 mg by mouth every morning.    DOXEPIN (SINEQUAN) 10 MG CAPSULE    Take 10 mg by mouth every evening.    ERGOCALCIFEROL (ERGOCALCIFEROL) 50,000 UNIT CAP    TAKE 1 CAPSULE BY MOUTH EVERY 7 DAYS    FINASTERIDE (PROSCAR) 5 MG TABLET    Take 1 tablet (5 mg total) by mouth once daily.    LOSARTAN (COZAAR) 50 MG TABLET    Take 1 tablet (50 mg total) by mouth once daily.    PNEUMOCOCCAL 23-JORGE PS (PNEUMOVAX-23) 25 MCG/0.5 ML VACCINE    Inject 0.5 mLs into the muscle.    PRAVASTATIN (PRAVACHOL) 40 MG TABLET    Take 1 tablet (40 mg total) by mouth once daily.    PROCHLORPERAZINE (COMPAZINE) 10 MG TABLET    Take 1 tablet (10 mg total) by mouth 3 (three) times daily as needed.    VARENICLINE (CHANTIX) 1 MG TAB    Take 1 tablet (1 mg total) by mouth 2 (two) times daily.

## 2024-01-31 ENCOUNTER — TELEPHONE (OUTPATIENT)
Dept: FAMILY MEDICINE | Facility: CLINIC | Age: 64
End: 2024-01-31
Payer: MEDICAID

## 2024-01-31 DIAGNOSIS — F32.A DEPRESSIVE DISORDER: Primary | ICD-10-CM

## 2024-01-31 NOTE — TELEPHONE ENCOUNTER
"Per Dr. Selby " Hey, I was given a name from our psych leads, they said Td Mckeon MD is the person they refer to for evaluations for firearms. "    Referral in chart  "

## 2024-02-09 ENCOUNTER — HOSPITAL ENCOUNTER (OUTPATIENT)
Dept: RADIOLOGY | Facility: HOSPITAL | Age: 64
Discharge: HOME | End: 2024-02-09
Payer: MEDICAID

## 2024-02-09 DIAGNOSIS — J44.89 COPD WITH ASTHMA: ICD-10-CM

## 2024-02-09 DIAGNOSIS — Z87.891 HISTORY OF NICOTINE DEPENDENCE: ICD-10-CM

## 2024-02-09 DIAGNOSIS — J47.9 BRONCHIECTASIS WITHOUT COMPLICATION: ICD-10-CM

## 2024-02-09 PROCEDURE — 71250 CT THORAX DX C-: CPT | Mod: TC

## 2024-02-09 PROCEDURE — 71250 CT THORAX DX C-: CPT | Mod: 26,,, | Performed by: RADIOLOGY

## 2024-03-18 ENCOUNTER — PATIENT MESSAGE (OUTPATIENT)
Dept: ADMINISTRATIVE | Facility: HOSPITAL | Age: 64
End: 2024-03-18
Payer: MEDICAID

## 2024-03-19 DIAGNOSIS — Z12.11 SCREENING FOR COLON CANCER: ICD-10-CM

## 2024-04-01 DIAGNOSIS — J47.9 BRONCHIECTASIS WITHOUT COMPLICATION: ICD-10-CM

## 2024-04-01 DIAGNOSIS — J44.89 COPD WITH ASTHMA: ICD-10-CM

## 2024-04-01 RX ORDER — ALBUTEROL SULFATE 0.83 MG/ML
2.5 SOLUTION RESPIRATORY (INHALATION) EVERY 6 HOURS PRN
Qty: 120 ML | Refills: 5 | Status: SHIPPED | OUTPATIENT
Start: 2024-04-01 | End: 2025-04-01

## 2024-04-01 NOTE — TELEPHONE ENCOUNTER
----- Message from Lety Larios sent at 4/1/2024 10:21 AM CDT -----  Contact: self  Type:  RX Refill Request    Who Called: Pt   Refill or New Rx: NEW Refill   RX Name and Strength: albuterol (PROVENTIL) 2.5 mg /3 mL (0.083 %) nebulizer solution  How is the patient currently taking it? (ex. 1XDay): as directed  Is this a 30 day or 90 day RX:   Preferred Pharmacy with phone number:   [facility]/pharmacy #[id number] - [place] - [address]  [place] [address]  Phone: [phone] Fax: [phone]  Local or Mail Order: Local  Ordering Provider: Yessica Delaney NP  Best Call Back Number: [phone]  Additional Information:  Pt states he ran out of nebulizer solution and need a Rx to be sent to [facility], pt states he previously was getting from [facility]... Thank you...

## 2024-04-10 LAB — HEMOCCULT STL QL IA: NEGATIVE

## 2024-04-17 ENCOUNTER — TELEPHONE (OUTPATIENT)
Dept: SMOKING CESSATION | Facility: CLINIC | Age: 64
End: 2024-04-17
Payer: MEDICAID

## 2024-04-25 ENCOUNTER — OFFICE VISIT (OUTPATIENT)
Dept: PULMONOLOGY | Facility: CLINIC | Age: 64
End: 2024-04-25
Payer: MEDICAID

## 2024-04-25 VITALS
HEIGHT: 74 IN | HEART RATE: 71 BPM | SYSTOLIC BLOOD PRESSURE: 134 MMHG | DIASTOLIC BLOOD PRESSURE: 82 MMHG | OXYGEN SATURATION: 97 % | BODY MASS INDEX: 25.62 KG/M2 | WEIGHT: 199.63 LBS

## 2024-04-25 DIAGNOSIS — J44.89 ASTHMA-COPD OVERLAP SYNDROME: Primary | ICD-10-CM

## 2024-04-25 PROCEDURE — 3044F HG A1C LEVEL LT 7.0%: CPT | Mod: CPTII,,, | Performed by: NURSE PRACTITIONER

## 2024-04-25 PROCEDURE — 1159F MED LIST DOCD IN RCRD: CPT | Mod: CPTII,,, | Performed by: NURSE PRACTITIONER

## 2024-04-25 PROCEDURE — 99213 OFFICE O/P EST LOW 20 MIN: CPT | Mod: PBBFAC,PO | Performed by: NURSE PRACTITIONER

## 2024-04-25 PROCEDURE — 99213 OFFICE O/P EST LOW 20 MIN: CPT | Mod: S$PBB,,, | Performed by: NURSE PRACTITIONER

## 2024-04-25 PROCEDURE — 3075F SYST BP GE 130 - 139MM HG: CPT | Mod: CPTII,,, | Performed by: NURSE PRACTITIONER

## 2024-04-25 PROCEDURE — 3079F DIAST BP 80-89 MM HG: CPT | Mod: CPTII,,, | Performed by: NURSE PRACTITIONER

## 2024-04-25 PROCEDURE — 99999 PR PBB SHADOW E&M-EST. PATIENT-LVL III: CPT | Mod: PBBFAC,,, | Performed by: NURSE PRACTITIONER

## 2024-04-25 PROCEDURE — 3008F BODY MASS INDEX DOCD: CPT | Mod: CPTII,,, | Performed by: NURSE PRACTITIONER

## 2024-04-25 RX ORDER — PREDNISONE 10 MG/1
TABLET ORAL
Qty: 18 TABLET | Refills: 0 | Status: SHIPPED | OUTPATIENT
Start: 2024-04-25

## 2024-04-25 NOTE — PROGRESS NOTES
4/25/2024    Rusty Carlos  Office note    Chief Complaint   Patient presents with    Follow-up    Asthma    Shortness of Breath       HPI:   4/25/2024- feeling good. SOB only with high levels of exertion. Improves with albuterol inhaler and rest. Using albuterol 1-2 times daily, on trelegy daily  Wife states he wheezes when he walks.    01/24/2024 He reports improvement in shortness of breath.  He feels his endurance and shortness of breath has improved significantly. He is able to work all day without any shortness of breath. He has intermittently wheezing. He is taking the Advair diskus and trelegy inhaler. He likes the Trelegy more than the advair for maintenance therapy. He is taking albuterol inhaler for rescue. He is now 5 months of cigarette.    10/20/2023- referred by PCP Nadiya, [facility]; had an abnormal PFT that showed Mild COPD. Treated with metered dose inhaler.   Complaint of shortness of breath, onset 2 years, worsened in past 2 years, worsens with exertion such as walking fast, associated with chest tightness  Complaint of cough- onset years, daily complaint, worse in mornings, quarter size yellow mucous. Has nocturnal coughing occasionally 4 x weekly.     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  Social History     Tobacco Use    Smoking status: Former     Current packs/day: 0.00     Average packs/day: 0.5 packs/day for 45.0 years (22.5 ttl pk-yrs)     Types: Cigarettes     Start date: 5/15/1978     Quit date: 5/15/2023      "Years since quittin.9    Smokeless tobacco: Never    Tobacco comments:     23 Active participant with smoking cessation.  5/15/23 QUIT SMOKING. 23 CO Monitor Score:1 pmm.   Substance Use Topics    Alcohol use: Yes     Comment: 1 beer every 2 days    Drug use: Yes     Types: Marijuana     Family History   Problem Relation Name Age of Onset    Heart disease Mother       Review of patient's allergies indicates:   Allergen Reactions    Lavender Hives and Rash     I have reviewed past medical, family, and social history. I have reviewed previous nurse notes.        Performance Status:The patient's activity level is housebound activities.      Review of Systems:  a review of eleven systems covering constitutional, Eye, HEENT, Psych, Respiratory, Cardiac, GI, , Musculoskeletal, Endocrine, Dermatologic was negative except for pertinent findings as listed ABOVE and below: pertinent positive as above, rest is good  Cough  Wheeze  Chest tightness  fatigue           Exam:Comprehensive exam done. /82 (BP Location: Right arm, Patient Position: Sitting, BP Method: Medium (Automatic))   Pulse 71   Ht 6' 2" (1.88 m)   Wt 90.5 kg (199 lb 10 oz)   SpO2 97% Comment: on room air at rest  BMI 25.63 kg/m²   Exam included Vitals as listed, and patient's appearance and affect and alertness and mood, oral exam for yeast and hygiene and pharynx lesions and Mallapatti (M) score, neck with inspection for jvd and masses and thyroid abnormalities and lymph nodes (supraclavicular and infraclavicular nodes and axillary also examined and noted if abn), chest exam included symmetry and effort and fremitus and percussion and auscultation, cardiac exam included rhythm and gallops and murmur and rubs and jvd and edema, abdominal exam for mass and hepatosplenomegaly and tenderness and hernias and bowel sounds, Musculoskeletal exam with muscle tone and posture and mobility/gait and  strength, and skin for rashes and cyanosis " "and pallor and turgor, extremity for clubbing.  Findings were normal except for pertinent findings listed below:   Breath sounds clear  M2      Radiographs (ct chest and cxr) reviewed: reviewed Echo Chest x-ray  patient imaging studies reviewed and interpreted independently. My personal interpretation of most resent images include:      CT Chest Without Contrast 02/09/2024 Few small pulmonary nodules in the range of 3 mm.  Transthoracic echo (TTE) complete 11/4/19   The estimated PA systolic pressure is 18 mm Hg   The estimated ejection fraction is 60%     X-Ray Chest PA And Lateral  06/15/2022 chest x-ray is clear        Labs: Patients labs reviewed including CBC and CMP   Latest Reference Range & Units 01/04/21 08:20   WBC 3.90 - 12.70 K/uL 8.20   RBC 4.60 - 6.20 M/uL 5.42   Hemoglobin 14.0 - 18.0 g/dL 16.3   Hematocrit 40.0 - 54.0 % 47.2      Latest Reference Range & Units Most Recent 11/02/19 04:39 11/25/19 14:29 01/04/21 08:20 01/24/24 10:32   Eos # 0.0 - 0.5 K/uL 0.1  1/24/24 10:32 0.1 0.1 0.1 0.1        Latest Reference Range & Units 11/01/19 17:25 11/02/19 04:39 11/25/19 14:29 01/04/21 08:20   CO2 23 - 29 mmol/L 31 (H) 29 30 (H) 26   (H): Data is abnormally high      PFT results reviewed  Pulmonary Functions Testing Results:  PFT 6/22/2023 FEV1 3.10 L 81% Mild obstruction  FEV1/FVC 6767 84%  TLC 91%  DLC0 106%    01/2024 FEV 2.96 75.5% mild obstruction  FEV1/FVC 69 90.2%  TLC 86%  DLCO 68.5%       Plan:  Clinical impression is resonably certain and repeated evaluation prn +/- follow up will be needed as below.    Rusty Trujillo" was seen today for follow-up, asthma and shortness of breath.    Diagnoses and all orders for this visit:    Asthma-COPD overlap syndrome  -     predniSONE (DELTASONE) 10 MG tablet; Take one pill a day for three days, repeat for shortness of breath  -     CBC auto differential; Future  -     IGE; Future        Follow up in about 3 months (around 7/25/2024), or if symptoms worsen or " fail to improve.      Discussed with patient above for education the following:      Patient Instructions   Have blood test on day of next visit

## 2024-07-30 ENCOUNTER — LAB VISIT (OUTPATIENT)
Dept: LAB | Facility: HOSPITAL | Age: 64
End: 2024-07-30
Attending: NURSE PRACTITIONER
Payer: MEDICAID

## 2024-07-30 ENCOUNTER — OFFICE VISIT (OUTPATIENT)
Dept: FAMILY MEDICINE | Facility: CLINIC | Age: 64
End: 2024-07-30
Payer: MEDICAID

## 2024-07-30 ENCOUNTER — OFFICE VISIT (OUTPATIENT)
Dept: PULMONOLOGY | Facility: CLINIC | Age: 64
End: 2024-07-30
Payer: MEDICAID

## 2024-07-30 VITALS
DIASTOLIC BLOOD PRESSURE: 78 MMHG | BODY MASS INDEX: 25.77 KG/M2 | HEIGHT: 74 IN | WEIGHT: 200.81 LBS | TEMPERATURE: 98 F | HEART RATE: 60 BPM | SYSTOLIC BLOOD PRESSURE: 136 MMHG | OXYGEN SATURATION: 97 %

## 2024-07-30 VITALS
DIASTOLIC BLOOD PRESSURE: 76 MMHG | SYSTOLIC BLOOD PRESSURE: 134 MMHG | HEIGHT: 74 IN | WEIGHT: 199.44 LBS | OXYGEN SATURATION: 97 % | HEART RATE: 58 BPM | BODY MASS INDEX: 25.59 KG/M2

## 2024-07-30 DIAGNOSIS — E78.2 MIXED HYPERLIPIDEMIA: ICD-10-CM

## 2024-07-30 DIAGNOSIS — J44.89 ASTHMA-COPD OVERLAP SYNDROME: ICD-10-CM

## 2024-07-30 DIAGNOSIS — R97.20 ELEVATED PSA: ICD-10-CM

## 2024-07-30 DIAGNOSIS — F32.A DEPRESSIVE DISORDER: ICD-10-CM

## 2024-07-30 DIAGNOSIS — I10 PRIMARY HYPERTENSION: Primary | ICD-10-CM

## 2024-07-30 DIAGNOSIS — J45.50 SEVERE PERSISTENT ASTHMA WITHOUT COMPLICATION: Primary | ICD-10-CM

## 2024-07-30 DIAGNOSIS — J44.89 COPD WITH ASTHMA: ICD-10-CM

## 2024-07-30 LAB
BASOPHILS # BLD AUTO: 0.05 K/UL (ref 0–0.2)
BASOPHILS NFR BLD: 0.7 % (ref 0–1.9)
DIFFERENTIAL METHOD BLD: NORMAL
EOSINOPHIL # BLD AUTO: 0.1 K/UL (ref 0–0.5)
EOSINOPHIL NFR BLD: 1.6 % (ref 0–8)
ERYTHROCYTE [DISTWIDTH] IN BLOOD BY AUTOMATED COUNT: 12.1 % (ref 11.5–14.5)
HCT VFR BLD AUTO: 44.5 % (ref 40–54)
HGB BLD-MCNC: 15 G/DL (ref 14–18)
IMM GRANULOCYTES # BLD AUTO: 0.03 K/UL (ref 0–0.04)
IMM GRANULOCYTES NFR BLD AUTO: 0.4 % (ref 0–0.5)
LYMPHOCYTES # BLD AUTO: 1.9 K/UL (ref 1–4.8)
LYMPHOCYTES NFR BLD: 24.4 % (ref 18–48)
MCH RBC QN AUTO: 29.6 PG (ref 27–31)
MCHC RBC AUTO-ENTMCNC: 33.7 G/DL (ref 32–36)
MCV RBC AUTO: 88 FL (ref 82–98)
MONOCYTES # BLD AUTO: 0.7 K/UL (ref 0.3–1)
MONOCYTES NFR BLD: 9.6 % (ref 4–15)
NEUTROPHILS # BLD AUTO: 4.9 K/UL (ref 1.8–7.7)
NEUTROPHILS NFR BLD: 63.3 % (ref 38–73)
NRBC BLD-RTO: 0 /100 WBC
PLATELET # BLD AUTO: 349 K/UL (ref 150–450)
PMV BLD AUTO: 9.5 FL (ref 9.2–12.9)
RBC # BLD AUTO: 5.07 M/UL (ref 4.6–6.2)
WBC # BLD AUTO: 7.69 K/UL (ref 3.9–12.7)

## 2024-07-30 PROCEDURE — 99999 PR PBB SHADOW E&M-EST. PATIENT-LVL III: CPT | Mod: PBBFAC,,, | Performed by: NURSE PRACTITIONER

## 2024-07-30 PROCEDURE — 99213 OFFICE O/P EST LOW 20 MIN: CPT | Mod: PBBFAC,PO | Performed by: NURSE PRACTITIONER

## 2024-07-30 PROCEDURE — 99213 OFFICE O/P EST LOW 20 MIN: CPT | Mod: PBBFAC,27,PO | Performed by: FAMILY MEDICINE

## 2024-07-30 PROCEDURE — 1160F RVW MEDS BY RX/DR IN RCRD: CPT | Mod: CPTII,,, | Performed by: FAMILY MEDICINE

## 2024-07-30 PROCEDURE — 82785 ASSAY OF IGE: CPT | Performed by: NURSE PRACTITIONER

## 2024-07-30 PROCEDURE — 3044F HG A1C LEVEL LT 7.0%: CPT | Mod: CPTII,,, | Performed by: FAMILY MEDICINE

## 2024-07-30 PROCEDURE — 3075F SYST BP GE 130 - 139MM HG: CPT | Mod: CPTII,,, | Performed by: FAMILY MEDICINE

## 2024-07-30 PROCEDURE — 3075F SYST BP GE 130 - 139MM HG: CPT | Mod: CPTII,,, | Performed by: NURSE PRACTITIONER

## 2024-07-30 PROCEDURE — 99999 PR PBB SHADOW E&M-EST. PATIENT-LVL III: CPT | Mod: PBBFAC,,, | Performed by: FAMILY MEDICINE

## 2024-07-30 PROCEDURE — 3078F DIAST BP <80 MM HG: CPT | Mod: CPTII,,, | Performed by: FAMILY MEDICINE

## 2024-07-30 PROCEDURE — 85025 COMPLETE CBC W/AUTO DIFF WBC: CPT | Performed by: NURSE PRACTITIONER

## 2024-07-30 PROCEDURE — 99214 OFFICE O/P EST MOD 30 MIN: CPT | Mod: S$PBB,,, | Performed by: FAMILY MEDICINE

## 2024-07-30 PROCEDURE — 3008F BODY MASS INDEX DOCD: CPT | Mod: CPTII,,, | Performed by: NURSE PRACTITIONER

## 2024-07-30 PROCEDURE — 3008F BODY MASS INDEX DOCD: CPT | Mod: CPTII,,, | Performed by: FAMILY MEDICINE

## 2024-07-30 PROCEDURE — 1159F MED LIST DOCD IN RCRD: CPT | Mod: CPTII,,, | Performed by: NURSE PRACTITIONER

## 2024-07-30 PROCEDURE — 99213 OFFICE O/P EST LOW 20 MIN: CPT | Mod: S$PBB,,, | Performed by: NURSE PRACTITIONER

## 2024-07-30 PROCEDURE — 3044F HG A1C LEVEL LT 7.0%: CPT | Mod: CPTII,,, | Performed by: NURSE PRACTITIONER

## 2024-07-30 PROCEDURE — 1159F MED LIST DOCD IN RCRD: CPT | Mod: CPTII,,, | Performed by: FAMILY MEDICINE

## 2024-07-30 PROCEDURE — 3078F DIAST BP <80 MM HG: CPT | Mod: CPTII,,, | Performed by: NURSE PRACTITIONER

## 2024-07-30 RX ORDER — TEZEPELUMAB-EKKO 210 MG/1.9ML
210 INJECTION, SOLUTION SUBCUTANEOUS
Qty: 1.91 ML | Refills: 11 | Status: ACTIVE | OUTPATIENT
Start: 2024-07-30

## 2024-07-30 RX ORDER — PREDNISONE 10 MG/1
TABLET ORAL
Qty: 18 TABLET | Refills: 0 | Status: SHIPPED | OUTPATIENT
Start: 2024-07-30

## 2024-07-30 NOTE — PROGRESS NOTES
"Subjective:       Patient ID: Rusty Carlos is a 63 y.o. male.    Chief Complaint: 6 month f/u (Arthritis in left hand )    Here today to follow up on chronic medical conditions.     COPD:  seeing Pulm.  He is on Trelegy and Albuterol PRN.  Saw today.  Started on Prednisone and tezspire  HTN:  He is on Norvasc.  BP is well controlled.  HLD: He is on statin therapy with Lipitor.  Lipids controlled in Jan 2024  MDD:  he is on Wellbutrin and Doxepin.  His mood is good.       Review of Systems   Constitutional:  Negative for appetite change, fatigue and fever.   Cardiovascular:  Negative for chest pain and palpitations.   Gastrointestinal:  Negative for abdominal pain, constipation, diarrhea, nausea and vomiting.   Genitourinary:  Negative for difficulty urinating, dysuria, frequency and hematuria.   Musculoskeletal:  Positive for arthralgias.   Neurological:  Negative for dizziness, syncope, weakness and headaches.   Psychiatric/Behavioral:  Negative for agitation, behavioral problems and confusion. The patient is not nervous/anxious.        Objective:      Vitals:    07/30/24 1314   BP: 136/78   Pulse: 60   Temp: 97.5 °F (36.4 °C)   SpO2: 97%   Weight: 91.1 kg (200 lb 13.4 oz)   Height: 6' 2" (1.88 m)      Physical Exam  Constitutional:       General: He is not in acute distress.  Cardiovascular:      Rate and Rhythm: Normal rate and regular rhythm.      Heart sounds: Normal heart sounds. No murmur heard.  Pulmonary:      Effort: Pulmonary effort is normal. No respiratory distress.      Breath sounds: Normal breath sounds. No wheezing, rhonchi or rales.   Musculoskeletal:         General: No swelling.   Skin:     General: Skin is warm and dry.   Neurological:      General: No focal deficit present.      Mental Status: He is alert.   Psychiatric:         Mood and Affect: Mood normal.         Behavior: Behavior normal.         Thought Content: Thought content normal.         Results for orders placed or performed in " visit on 07/30/24   CBC auto differential   Result Value Ref Range    WBC 7.69 3.90 - 12.70 K/uL    RBC 5.07 4.60 - 6.20 M/uL    Hemoglobin 15.0 14.0 - 18.0 g/dL    Hematocrit 44.5 40.0 - 54.0 %    MCV 88 82 - 98 fL    MCH 29.6 27.0 - 31.0 pg    MCHC 33.7 32.0 - 36.0 g/dL    RDW 12.1 11.5 - 14.5 %    Platelets 349 150 - 450 K/uL    MPV 9.5 9.2 - 12.9 fL    Immature Granulocytes 0.4 0.0 - 0.5 %    Gran # (ANC) 4.9 1.8 - 7.7 K/uL    Immature Grans (Abs) 0.03 0.00 - 0.04 K/uL    Lymph # 1.9 1.0 - 4.8 K/uL    Mono # 0.7 0.3 - 1.0 K/uL    Eos # 0.1 0.0 - 0.5 K/uL    Baso # 0.05 0.00 - 0.20 K/uL    nRBC 0 0 /100 WBC    Gran % 63.3 38.0 - 73.0 %    Lymph % 24.4 18.0 - 48.0 %    Mono % 9.6 4.0 - 15.0 %    Eosinophil % 1.6 0.0 - 8.0 %    Basophil % 0.7 0.0 - 1.9 %    Differential Method Automated       Assessment:       1. Primary hypertension    2. Mixed hyperlipidemia    3. COPD with asthma    4. Depressive disorder    5. Elevated PSA        Plan:       Primary hypertension  -     COMPREHENSIVE METABOLIC PANEL; Future; Expected date: 07/30/2024  Continue Current medicatioon  Mixed hyperlipidemia  Continue Statin  COPD with asthma  Mgt per Pulm  Depressive disorder  Stable, continue Wellbutrin  Elevated PSA  -     PROSTATE SPECIFIC ANTIGEN, DIAGNOSTIC; Future; Expected date: 07/30/2024    Use Tylenol PRN joint pain      Medication List with Changes/Refills   Current Medications    ALBUTEROL (PROVENTIL) 2.5 MG /3 ML (0.083 %) NEBULIZER SOLUTION    Take 3 mLs (2.5 mg total) by nebulization every 6 (six) hours as needed for Wheezing. Rescue    ALBUTEROL (VENTOLIN HFA) 90 MCG/ACTUATION INHALER    Inhale 2 puffs into the lungs every 4 (four) hours as needed for Shortness of Breath. Rescue    AMLODIPINE (NORVASC) 10 MG TABLET    Take 1 tablet (10 mg total) by mouth once daily.    ASPIRIN (ECOTRIN) 81 MG EC TABLET    Take 1 tablet (81 mg total) by mouth once daily.    ATORVASTATIN (LIPITOR) 40 MG TABLET    Take 1 tablet (40 mg  total) by mouth every evening.    BUPROPION (WELLBUTRIN XL) 150 MG TB24 TABLET    Take 1 tablet (150 mg total) by mouth every morning.    DOXEPIN (SINEQUAN) 10 MG CAPSULE    Take 1 capsule (10 mg total) by mouth every evening.    FLUTICASONE-UMECLIDIN-VILANTER (TRELEGY ELLIPTA) 100-62.5-25 MCG DSDV    Inhale 1 puff into the lungs once daily.    PREDNISONE (DELTASONE) 10 MG TABLET    Take one pill a day for three days, repeat for shortness of breath    TEZEPELUMAB-EKKO (TEZSPIRE) 210 MG/1.91 ML (110 MG/ML) PNEDYTA    Inject 210 mg into the skin every 28 days.

## 2024-07-30 NOTE — PATIENT INSTRUCTIONS
Starting new medication for asthma, expect phone call from [facility]    Continue Trelegy daily    Use prednisone when needed

## 2024-07-30 NOTE — PROGRESS NOTES
7/30/2024    Rusty Carlos  Office note    Chief Complaint   Patient presents with    3m f/u    Asthma       HPI:   7/30/24- on Trelegy daily, had flare up from weather change, improved with nebulized albuterol treatments and systemic steroids. States cough is recurrent complaint and non productive,   Took prednisone April and May 2024 with benefit.     4/25/2024- feeling good. SOB only with high levels of exertion. Improves with albuterol inhaler and rest. Using albuterol 1-2 times daily, on trelegy daily  Wife states he wheezes when he walks.    01/24/2024 He reports improvement in shortness of breath.  He feels his endurance and shortness of breath has improved significantly. He is able to work all day without any shortness of breath. He has intermittently wheezing. He is taking the Advair diskus and trelegy inhaler. He likes the Trelegy more than the advair for maintenance therapy. He is taking albuterol inhaler for rescue. He is now 5 months of cigarette.    10/20/2023- referred by PCP Nadiya, [facility]; had an abnormal PFT that showed Mild COPD. Treated with metered dose inhaler.   Complaint of shortness of breath, onset 2 years, worsened in past 2 years, worsens with exertion such as walking fast, associated with chest tightness  Complaint of cough- onset years, daily complaint, worse in mornings, quarter size yellow mucous. Has nocturnal coughing occasionally 4 x weekly.     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  Social History  "    Tobacco Use    Smoking status: Former     Current packs/day: 0.00     Average packs/day: 0.5 packs/day for 45.0 years (22.5 ttl pk-yrs)     Types: Cigarettes     Start date: 5/15/1978     Quit date: 5/15/2023     Years since quittin.2    Smokeless tobacco: Never    Tobacco comments:     23 Active participant with smoking cessation.  5/15/23 QUIT SMOKING. 23 CO Monitor Score:1 pmm.   Substance Use Topics    Alcohol use: Yes     Comment: 1 beer every 2 days    Drug use: Yes     Types: Marijuana     Family History   Problem Relation Name Age of Onset    Heart disease Mother       Review of patient's allergies indicates:   Allergen Reactions    Lavender Hives and Rash     I have reviewed past medical, family, and social history. I have reviewed previous nurse notes.        Performance Status:The patient's activity level is housebound activities.      Review of Systems:  a review of eleven systems covering constitutional, Eye, HEENT, Psych, Respiratory, Cardiac, GI, , Musculoskeletal, Endocrine, Dermatologic was negative except for pertinent findings as listed ABOVE and below: pertinent positive as above, rest is good  Cough  Wheeze  Chest tightness  fatigue           Exam:Comprehensive exam done. /76 (BP Location: Right arm, Patient Position: Sitting, BP Method: Medium (Automatic))   Pulse (!) 58   Ht 6' 2" (1.88 m)   Wt 90.5 kg (199 lb 6.5 oz)   SpO2 97% Comment: on room air at rest  BMI 25.60 kg/m²   Exam included Vitals as listed, and patient's appearance and affect and alertness and mood, oral exam for yeast and hygiene and pharynx lesions and Mallapatti (M) score, neck with inspection for jvd and masses and thyroid abnormalities and lymph nodes (supraclavicular and infraclavicular nodes and axillary also examined and noted if abn), chest exam included symmetry and effort and fremitus and percussion and auscultation, cardiac exam included rhythm and gallops and murmur and rubs and jvd " "and edema, abdominal exam for mass and hepatosplenomegaly and tenderness and hernias and bowel sounds, Musculoskeletal exam with muscle tone and posture and mobility/gait and  strength, and skin for rashes and cyanosis and pallor and turgor, extremity for clubbing.  Findings were normal except for pertinent findings listed below:   Breath sounds clear  M2      Radiographs (ct chest and cxr) reviewed: reviewed Echo Chest x-ray  patient imaging studies reviewed and interpreted independently. My personal interpretation of most resent images include:      CT Chest Without Contrast 02/09/2024 Few small pulmonary nodules in the range of 3 mm.  Transthoracic echo (TTE) complete 11/4/19   The estimated PA systolic pressure is 18 mm Hg   The estimated ejection fraction is 60%     X-Ray Chest PA And Lateral  06/15/2022 chest x-ray is clear        Labs: Patients labs reviewed including CBC and CMP   Latest Reference Range & Units 01/04/21 08:20   WBC 3.90 - 12.70 K/uL 8.20   RBC 4.60 - 6.20 M/uL 5.42   Hemoglobin 14.0 - 18.0 g/dL 16.3   Hematocrit 40.0 - 54.0 % 47.2      Latest Reference Range & Units Most Recent 11/02/19 04:39 11/25/19 14:29 01/04/21 08:20 01/24/24 10:32   Eos # 0.0 - 0.5 K/uL 0.1  1/24/24 10:32 0.1 0.1 0.1 0.1        Latest Reference Range & Units 11/01/19 17:25 11/02/19 04:39 11/25/19 14:29 01/04/21 08:20   CO2 23 - 29 mmol/L 31 (H) 29 30 (H) 26   (H): Data is abnormally high      PFT results reviewed  Pulmonary Functions Testing Results:    PFT 1/24/24 FEV1 /FVC 69% Moderate obstruction with Asthma  FEV1 86% with 12% bronchodilator response,   DLC0 68% and TLC 86%      PFT 6/22/2023 FEV1 3.10 L 81% Mild obstruction  FEV1/FVC 6767 84%  TLC 91%  DLC0 106%    01/2024 FEV 2.96 75.5% mild obstruction  FEV1/FVC 69 90.2%  TLC 86%  DLCO 68.5%       Plan:  Clinical impression is resonably certain and repeated evaluation prn +/- follow up will be needed as below.    Rusty Trujillo" was seen today for 3m f/u and " asthma.    Diagnoses and all orders for this visit:    Severe persistent asthma without complication  -     predniSONE (DELTASONE) 10 MG tablet; Take one pill a day for three days, repeat for shortness of breath  -     tezepelumab-ekko (TEZSPIRE) 210 mg/1.91 mL (110 mg/mL) PnIj; Inject 210 mg into the skin every 28 days.    Asthma-COPD overlap syndrome  -     predniSONE (DELTASONE) 10 MG tablet; Take one pill a day for three days, repeat for shortness of breath        Follow up in about 3 months (around 10/30/2024), or if symptoms worsen or fail to improve.      Discussed with patient above for education the following:      Patient Instructions   Starting new medication for asthma, expect phone call from [facility]    Continue Trelegy daily    Use prednisone when needed

## 2024-08-01 LAB — IGE SERPL-ACNC: 13.1 KU/L

## 2024-08-02 PROBLEM — J45.50 SEVERE PERSISTENT ASTHMA WITHOUT COMPLICATION: Status: ACTIVE | Noted: 2024-08-02

## 2024-08-07 ENCOUNTER — LAB VISIT (OUTPATIENT)
Dept: LAB | Facility: HOSPITAL | Age: 64
End: 2024-08-07
Attending: FAMILY MEDICINE
Payer: MEDICAID

## 2024-08-07 DIAGNOSIS — R97.20 ELEVATED PSA: ICD-10-CM

## 2024-08-07 DIAGNOSIS — I10 PRIMARY HYPERTENSION: ICD-10-CM

## 2024-08-07 LAB
ALBUMIN SERPL BCP-MCNC: 4.1 G/DL (ref 3.5–5.2)
ALP SERPL-CCNC: 106 U/L (ref 55–135)
ALT SERPL W/O P-5'-P-CCNC: 39 U/L (ref 10–44)
ANION GAP SERPL CALC-SCNC: 10 MMOL/L (ref 8–16)
AST SERPL-CCNC: 24 U/L (ref 10–40)
BILIRUB SERPL-MCNC: 0.4 MG/DL (ref 0.1–1)
BUN SERPL-MCNC: 9 MG/DL (ref 8–23)
CALCIUM SERPL-MCNC: 9.7 MG/DL (ref 8.7–10.5)
CHLORIDE SERPL-SCNC: 106 MMOL/L (ref 95–110)
CO2 SERPL-SCNC: 27 MMOL/L (ref 23–29)
COMPLEXED PSA SERPL-MCNC: 5.1 NG/ML (ref 0–4)
CREAT SERPL-MCNC: 1.2 MG/DL (ref 0.5–1.4)
EST. GFR  (NO RACE VARIABLE): >60 ML/MIN/1.73 M^2
GLUCOSE SERPL-MCNC: 94 MG/DL (ref 70–110)
POTASSIUM SERPL-SCNC: 4.7 MMOL/L (ref 3.5–5.1)
PROT SERPL-MCNC: 7.4 G/DL (ref 6–8.4)
SODIUM SERPL-SCNC: 143 MMOL/L (ref 136–145)

## 2024-08-07 PROCEDURE — 80053 COMPREHEN METABOLIC PANEL: CPT | Performed by: FAMILY MEDICINE

## 2024-08-07 PROCEDURE — 84153 ASSAY OF PSA TOTAL: CPT | Performed by: FAMILY MEDICINE

## 2024-08-07 PROCEDURE — 36415 COLL VENOUS BLD VENIPUNCTURE: CPT | Performed by: FAMILY MEDICINE

## 2024-08-27 ENCOUNTER — TELEPHONE (OUTPATIENT)
Dept: PULMONOLOGY | Facility: CLINIC | Age: 64
End: 2024-08-27
Payer: MEDICAID

## 2024-08-27 NOTE — TELEPHONE ENCOUNTER
----- Message from Cheryl Dhiraj sent at 8/27/2024  8:20 AM CDT -----  Contact: pt  Type: Needs Medical Advice         Who Called: pt  Best Call Back Number:[phone]  Additional Information: Requesting a call back regarding  pt is needing the office to call him. Pt said he was told  trelegy medication 3mth in a row  then a self injection shot for COPD. The pharm is stating he did not  the  trelegy medication 3mth and they will not give him the self injection shot for COPD. Pt is not sure what he needs to do. If she can restart the 3mth?  Pt said he has the trelegy medication and has been using it . Pt uses 2 different lira and getting confused.       Please Advise- Thank you

## 2024-08-27 NOTE — TELEPHONE ENCOUNTER
I informed patient he has to fill the Trelegy for the next 3 months.  We can then re-send for Tezspire to see if we can get it approved.  Pt verbalized understanding.

## 2024-08-28 ENCOUNTER — TELEPHONE (OUTPATIENT)
Dept: PULMONOLOGY | Facility: CLINIC | Age: 64
End: 2024-08-28
Payer: MEDICAID

## 2024-08-28 NOTE — TELEPHONE ENCOUNTER
----- Message from Dali Jesus LPN sent at 8/27/2024  3:44 PM CDT -----  Regarding: RE: Tezspire  I spoke to the patient and he will be adherent to the Trelegy refill for the next 3 month and we can resubmit after that for Tezspire.    Kindly,  Dali ELKINSMICHELLE  ----- Message -----  From: Kenyatta Betancourt  Sent: 8/12/2024   8:05 AM CDT  To: Dali Jesus LPN  Subject: FW: Tezspire                                       ----- Message -----  From: Rosemary Muse, Chelita  Sent: 8/6/2024  11:10 AM CDT  To: Yessica Delaney NP; Rhett Juan Staff  Subject: Tezspire                                         Good morning,     [facility] received the prescription for Tezspire. Insurance required submission of a prior authorization, which was denied on the basis of the patient being non-adherent to his Trelegy controller. The insurance company requires the patient continue on the controller medication x3 consecutive months and continued use with Tezspire. The patient has not picked up his Trelegy since 1/2024. Therefore, I would recommend the patient be adherent to his controller medication x3 months starting now and then reconsider the Tezspire.     To note, the insurance company recommended alternative options such as Fasenra or Xolair, but with this patient's normalized IgE and eosinophil counts, Tezspire may need an appeal after 3 months of consistent controller therapy.     We are going to discontinue his enrollment at this time. Please send us a new prescription after 3 months.     Thanks,   Rosemary Muse, PharmD  Clinical Pharmacist  [facility]  (P) [phone]  (F) [phone]

## 2024-11-06 ENCOUNTER — HOSPITAL ENCOUNTER (EMERGENCY)
Facility: HOSPITAL | Age: 64
Discharge: HOME | End: 2024-11-06
Attending: STUDENT IN AN ORGANIZED HEALTH CARE EDUCATION/TRAINING PROGRAM
Payer: MEDICAID

## 2024-11-06 VITALS
SYSTOLIC BLOOD PRESSURE: 167 MMHG | BODY MASS INDEX: 25.68 KG/M2 | DIASTOLIC BLOOD PRESSURE: 85 MMHG | OXYGEN SATURATION: 96 % | WEIGHT: 200 LBS | HEART RATE: 87 BPM | RESPIRATION RATE: 18 BRPM | TEMPERATURE: 99 F

## 2024-11-06 DIAGNOSIS — J06.9 URI (UPPER RESPIRATORY INFECTION): ICD-10-CM

## 2024-11-06 DIAGNOSIS — J44.9 COPD (CHRONIC OBSTRUCTIVE PULMONARY DISEASE): ICD-10-CM

## 2024-11-06 DIAGNOSIS — J10.1 INFLUENZA A: Primary | ICD-10-CM

## 2024-11-06 LAB
ALBUMIN SERPL BCP-MCNC: 4.4 G/DL (ref 3.5–5.2)
ALP SERPL-CCNC: 104 U/L (ref 55–135)
ALT SERPL W/O P-5'-P-CCNC: 24 U/L (ref 10–44)
ANION GAP SERPL CALC-SCNC: 10 MMOL/L (ref 8–16)
AST SERPL-CCNC: 20 U/L (ref 10–40)
BASOPHILS # BLD AUTO: 0.04 K/UL (ref 0–0.2)
BASOPHILS NFR BLD: 0.4 % (ref 0–1.9)
BILIRUB SERPL-MCNC: 0.5 MG/DL (ref 0.1–1)
BUN SERPL-MCNC: 6 MG/DL (ref 8–23)
CALCIUM SERPL-MCNC: 9.1 MG/DL (ref 8.7–10.5)
CHLORIDE SERPL-SCNC: 99 MMOL/L (ref 95–110)
CO2 SERPL-SCNC: 27 MMOL/L (ref 23–29)
CREAT SERPL-MCNC: 1.1 MG/DL (ref 0.5–1.4)
DIFFERENTIAL METHOD BLD: ABNORMAL
EOSINOPHIL # BLD AUTO: 0 K/UL (ref 0–0.5)
EOSINOPHIL NFR BLD: 0.3 % (ref 0–8)
ERYTHROCYTE [DISTWIDTH] IN BLOOD BY AUTOMATED COUNT: 12.5 % (ref 11.5–14.5)
EST. GFR  (NO RACE VARIABLE): >60 ML/MIN/1.73 M^2
GLUCOSE SERPL-MCNC: 105 MG/DL (ref 70–110)
HCT VFR BLD AUTO: 40.3 % (ref 40–54)
HGB BLD-MCNC: 14.1 G/DL (ref 14–18)
IMM GRANULOCYTES # BLD AUTO: 0.08 K/UL (ref 0–0.04)
IMM GRANULOCYTES NFR BLD AUTO: 0.9 % (ref 0–0.5)
INFLUENZA A, MOLECULAR: POSITIVE
INFLUENZA B, MOLECULAR: NEGATIVE
LYMPHOCYTES # BLD AUTO: 0.3 K/UL (ref 1–4.8)
LYMPHOCYTES NFR BLD: 3.7 % (ref 18–48)
MCH RBC QN AUTO: 30.5 PG (ref 27–31)
MCHC RBC AUTO-ENTMCNC: 35 G/DL (ref 32–36)
MCV RBC AUTO: 87 FL (ref 82–98)
MONOCYTES # BLD AUTO: 1.3 K/UL (ref 0.3–1)
MONOCYTES NFR BLD: 13.7 % (ref 4–15)
NEUTROPHILS # BLD AUTO: 7.5 K/UL (ref 1.8–7.7)
NEUTROPHILS NFR BLD: 81 % (ref 38–73)
NRBC BLD-RTO: 0 /100 WBC
OHS QRS DURATION: 80 MS
OHS QTC CALCULATION: 430 MS
PLATELET # BLD AUTO: 266 K/UL (ref 150–450)
PMV BLD AUTO: 9.3 FL (ref 9.2–12.9)
POTASSIUM SERPL-SCNC: 3.4 MMOL/L (ref 3.5–5.1)
PROT SERPL-MCNC: 7.3 G/DL (ref 6–8.4)
RBC # BLD AUTO: 4.63 M/UL (ref 4.6–6.2)
SARS-COV-2 RDRP RESP QL NAA+PROBE: NEGATIVE
SODIUM SERPL-SCNC: 136 MMOL/L (ref 136–145)
SPECIMEN SOURCE: ABNORMAL
TROPONIN I SERPL HS-MCNC: 7.6 PG/ML (ref 0–14.9)
WBC # BLD AUTO: 9.2 K/UL (ref 3.9–12.7)

## 2024-11-06 PROCEDURE — 84484 ASSAY OF TROPONIN QUANT: CPT | Performed by: STUDENT IN AN ORGANIZED HEALTH CARE EDUCATION/TRAINING PROGRAM

## 2024-11-06 PROCEDURE — 99285 EMERGENCY DEPT VISIT HI MDM: CPT | Mod: 25

## 2024-11-06 PROCEDURE — 87635 SARS-COV-2 COVID-19 AMP PRB: CPT | Performed by: STUDENT IN AN ORGANIZED HEALTH CARE EDUCATION/TRAINING PROGRAM

## 2024-11-06 PROCEDURE — 25000003 PHARM REV CODE 250: Performed by: STUDENT IN AN ORGANIZED HEALTH CARE EDUCATION/TRAINING PROGRAM

## 2024-11-06 PROCEDURE — 87502 INFLUENZA DNA AMP PROBE: CPT | Performed by: STUDENT IN AN ORGANIZED HEALTH CARE EDUCATION/TRAINING PROGRAM

## 2024-11-06 PROCEDURE — 85025 COMPLETE CBC W/AUTO DIFF WBC: CPT | Performed by: STUDENT IN AN ORGANIZED HEALTH CARE EDUCATION/TRAINING PROGRAM

## 2024-11-06 PROCEDURE — 80053 COMPREHEN METABOLIC PANEL: CPT | Performed by: STUDENT IN AN ORGANIZED HEALTH CARE EDUCATION/TRAINING PROGRAM

## 2024-11-06 RX ORDER — CETIRIZINE HYDROCHLORIDE 10 MG/1
10 TABLET ORAL DAILY
Status: DISCONTINUED | OUTPATIENT
Start: 2024-11-07 | End: 2024-11-06

## 2024-11-06 RX ORDER — CETIRIZINE HYDROCHLORIDE 10 MG/1
10 TABLET ORAL
Status: COMPLETED | OUTPATIENT
Start: 2024-11-06 | End: 2024-11-06

## 2024-11-06 RX ADMIN — CETIRIZINE HYDROCHLORIDE 10 MG: 10 TABLET, FILM COATED ORAL at 03:11

## 2024-11-06 NOTE — ED PROVIDER NOTES
Encounter Date: 2024       History     Chief Complaint   Patient presents with    Fatigue     X 2 weeks with some improvement and the last two days has felt bad again.also c/o headache with congestion and runny nose.      HPI    Rusty Carlos is a 64 y.o. male with a past medical history of hypertension and COPD that presents emergency department for evaluation of flu-like symptoms.  Symptoms initially began 2 weeks ago and did have some modest improvement, but over the past 3-4 days, he has had worsening symptoms.  He is complaining of headache, nasal congestion, nonproductive cough, and fatigue.  He was taking his albuterol inhaler without improvement of symptoms.  Denies fevers, leg swelling, chest pain, shortness of breath, nausea, vomiting, and abdominal pain.    Review of patient's allergies indicates:   Allergen Reactions    Lavender Hives and Rash     Past Medical History:   Diagnosis Date    COPD with asthma 2024    Hypertension     Stroke 2019     History reviewed. No pertinent surgical history.  Family History   Problem Relation Name Age of Onset    Heart disease Mother       Social History     Tobacco Use    Smoking status: Former     Current packs/day: 0.00     Average packs/day: 0.5 packs/day for 45.0 years (22.5 ttl pk-yrs)     Types: Cigarettes     Start date: 5/15/1978     Quit date: 5/15/2023     Years since quittin.4    Smokeless tobacco: Never    Tobacco comments:     23 Active participant with smoking cessation.  5/15/23 QUIT SMOKING. 23 CO Monitor Score:1 pmm.   Substance Use Topics    Alcohol use: Yes     Comment: once every few weeks    Drug use: Yes     Types: Marijuana     Review of Systems   Constitutional:  Negative for fever.   HENT:  Positive for rhinorrhea and sneezing. Negative for sore throat.    Respiratory:  Positive for cough. Negative for shortness of breath.    Cardiovascular:  Negative for chest pain and leg swelling.   Gastrointestinal:  Negative for  abdominal pain, diarrhea, nausea and vomiting.   Genitourinary:  Negative for dysuria, frequency and hematuria.   Musculoskeletal:  Negative for back pain.   Skin:  Negative for rash.   Neurological:  Negative for weakness.   Hematological:  Does not bruise/bleed easily.       Physical Exam     Initial Vitals [11/06/24 1353]   BP Pulse Resp Temp SpO2   (!) 144/82 98 18 100.2 °F (37.9 °C) 97 %      MAP       --         Physical Exam    Nursing note and vitals reviewed.  Constitutional: He appears well-developed and well-nourished.   Fatigued appearing   HENT:   Head: Normocephalic and atraumatic.   Eyes: EOM are normal. Pupils are equal, round, and reactive to light.   Neck:   Normal range of motion.  Cardiovascular:  Normal rate, regular rhythm and normal heart sounds.           Pulmonary/Chest: Breath sounds normal. No respiratory distress. He has no wheezes. He has no rhonchi. He has no rales.   Abdominal: Abdomen is soft. He exhibits no distension. There is no abdominal tenderness. There is no rebound.   Musculoskeletal:         General: Normal range of motion.      Cervical back: Normal range of motion.     Neurological: He is alert and oriented to person, place, and time. He has normal strength. No cranial nerve deficit or sensory deficit. GCS score is 15. GCS eye subscore is 4. GCS verbal subscore is 5. GCS motor subscore is 6.   Skin: Capillary refill takes less than 2 seconds. No rash noted.   Psychiatric: He has a normal mood and affect.         ED Course   Procedures  Labs Reviewed   CBC W/ AUTO DIFFERENTIAL - Abnormal       Result Value    WBC 9.20      RBC 4.63      Hemoglobin 14.1      Hematocrit 40.3      MCV 87      MCH 30.5      MCHC 35.0      RDW 12.5      Platelets 266      MPV 9.3      Immature Granulocytes 0.9 (*)     Gran # (ANC) 7.5      Immature Grans (Abs) 0.08 (*)     Lymph # 0.3 (*)     Mono # 1.3 (*)     Eos # 0.0      Baso # 0.04      nRBC 0      Gran % 81.0 (*)     Lymph % 3.7 (*)      Mono % 13.7      Eosinophil % 0.3      Basophil % 0.4      Differential Method Automated     COMPREHENSIVE METABOLIC PANEL - Abnormal    Sodium 136      Potassium 3.4 (*)     Chloride 99      CO2 27      Glucose 105      BUN 6 (*)     Creatinine 1.1      Calcium 9.1      Total Protein 7.3      Albumin 4.4      Total Bilirubin 0.5      Alkaline Phosphatase 104      AST 20      ALT 24      eGFR >60.0      Anion Gap 10     INFLUENZA A AND B ANTIGEN - Abnormal    Influenza A, Molecular Positive (*)     Influenza B, Molecular Negative      Flu A & B Source Nasal swab      Narrative:     Specimen Source->Nasopharyngeal Swab   TROPONIN I HIGH SENSITIVITY    Troponin I High Sensitivity 7.6     SARS-COV-2 RNA AMPLIFICATION, QUAL    SARS-CoV-2 RNA, Amplification, Qual Negative          ECG Results              EKG 12-lead (In process)        Collection Time Result Time QRS Duration [facility] QTC Calculation    11/06/24 15:08:48 11/06/24 15:15:38 80 430                     In process by Interface, Lab In [facility] (11/06/24 15:15:43)                   Narrative:    Test Reason : J06.9,    Vent. Rate : 089 BPM     Atrial Rate : 089 BPM     P-R Int : 174 ms          QRS Dur : 080 ms      QT Int : 354 ms       P-R-T Axes : 056 -41 049 degrees     QTc Int : 430 ms    Normal sinus rhythm  Left axis deviation  Inferior infarct ,age undetermined  Abnormal ECG  When compared with ECG of 03-JUN-2022 11:47,  Vent. rate has increased BY  29 BPM  Inferior infarct is now Present    Referred By: [email or web address]   SELF           Confirmed By:                                   Imaging Results              X-Ray Chest PA And Lateral (Final result)  Result time 11/06/24 15:39:58      Final result by Luiz Sosa MD (11/06/24 15:39:58)                   Impression:      No evidence of active cardiopulmonary disease.      Electronically signed by: Luiz Sosa  Date:    11/06/2024  Time:    15:39               Narrative:    EXAMINATION:  XR CHEST PA AND  LATERAL    CLINICAL HISTORY:  Chronic obstructive pulmonary disease, unspecified    FINDINGS:  PA and lateral chest radiograph compared to prior exams show the trachea is midline, with the cardiomediastinal silhouette and pulmonary vascular distribution within normal limits.    The lungs are normally and symmetrically expanded, with no consolidation, pleural effusion or evidence of pulmonary edema. No confluent infiltrates or pneumothorax. There are no significant osseous abnormalities.                                       Medications   cetirizine tablet 10 mg (10 mg Oral Given 11/6/24 7906)     Medical Decision Making  Rusty Carlos is a 64 y.o. male with a past medical history of hypertension and COPD that presents emergency department for evaluation of flu-like symptoms.  Vitals stable.  Satting well on room air.  Exam with fatigued but nontoxic appearing male.  Lungs are clear.  Heart sounds within normal limits.  Abdominal exam benign.  Neuro exam was nonfocal.  No pale conjunctiva.  No lower extremity edema.  Differential included but was not limited to ACS, pneumonia, pneumothorax, influenza, COVID-19, symptomatic anemia, electrolyte abnormality, and symptomatic pleural effusion.  Chest x-ray was clear.  EKG showed normal sinus rhythm without acute ST segment or T-wave changes.  Troponin was negative.  Considered heart failure, but patient was did not have symptoms consistent with this.  Influenza a positive.  COVID-19 negative.  Hemoglobin of 14.1 making symptomatic anemia less likely.  Patient was explained his influenza diagnosis and instructed to rest at home if possible.  Patient was outside the window for Tamiflu.  Instructed to take over-the-counter remedies.  Verbalized understanding appearing instructed to follow up with PCP.  Return precautions given.  Discussed utility of obtaining a pulse ox for further evaluation of his influenza symptoms.    Amount and/or Complexity of Data Reviewed  Labs:  ordered.  Radiology: ordered.    Risk  OTC drugs.                                      Clinical Impression:  Final diagnoses:  [J06.9] URI (upper respiratory infection)  [J44.9] COPD (chronic obstructive pulmonary disease)  [J10.1] Influenza A (Primary)          ED Disposition Condition    Discharge Stable          ED Prescriptions    None       Follow-up Information       Follow up With Specialties Details Why Contact Info Additional Information    [facility] - Emergency Dept Emergency Medicine  As needed, If symptoms worsen [address] [facility] [address]  [phone] 1st floor    Gerardo Dominguez MD Family Medicine In 3 days Follow-up on ED visit [address] [facility] [address] [place] [address]  [phone]                Baljit Pandya MD  11/06/24 [id number]

## 2024-11-06 NOTE — DISCHARGE INSTRUCTIONS
Please return to emergency department if you have new or worsening symptoms.  Follow up with the primary care doctor.  Begin using over-the-counter remedies such as cetirizine or DayQuil or NyQuil for your symptoms.  
Alert and oriented to person, place, time/situation. normal mood and affect. no apparent risk to self or others.

## 2024-11-08 ENCOUNTER — TELEPHONE (OUTPATIENT)
Dept: PULMONOLOGY | Facility: CLINIC | Age: 64
End: 2024-11-08
Payer: MEDICAID

## 2024-11-08 NOTE — TELEPHONE ENCOUNTER
----- Message from Dewey sent at 11/8/2024 11:54 AM CST -----  Contact: self  Type: Needs Medical Advice  Who Called:  Patient    Best Call Back Number: [phone]    Additional Information: States he would like to speak with office regarding nebulizer says he lost part that he pours liquid into.Please call and advise

## 2024-11-08 NOTE — TELEPHONE ENCOUNTER
----- Message from Dewey sent at 11/8/2024 11:54 AM CST -----  Contact: self  Type: Needs Medical Advice  Who Called:  Patient    Best Call Back Number: [phone]    Additional Information: States he would like to speak with office regarding nebulizer says he lost part that he pours liquid into.Please call and advise

## 2024-11-08 NOTE — TELEPHONE ENCOUNTER
Returned pt call, his chamber to nebulizer is broken, notified pt a nebulizer kit would be placed at  for him to pickup. Pt verbalized understanding.

## 2024-11-08 NOTE — TELEPHONE ENCOUNTER
----- Message from Dewey sent at 11/8/2024 11:54 AM CST -----  Contact: self  Type: Needs Medical Advice  Who Called:  Patient    Best Call Back Number: [phone]    Additional Information: States he would like to speak with office regarding nebulizer says he lost part that he pours liquid into.Please call and advise

## 2025-02-11 ENCOUNTER — OFFICE VISIT (OUTPATIENT)
Dept: FAMILY MEDICINE | Facility: CLINIC | Age: 65
End: 2025-02-11
Payer: MEDICAID

## 2025-02-11 VITALS
WEIGHT: 205.69 LBS | HEIGHT: 74 IN | OXYGEN SATURATION: 97 % | TEMPERATURE: 98 F | BODY MASS INDEX: 26.4 KG/M2 | SYSTOLIC BLOOD PRESSURE: 132 MMHG | HEART RATE: 68 BPM | DIASTOLIC BLOOD PRESSURE: 82 MMHG

## 2025-02-11 DIAGNOSIS — R05.9 COUGH, UNSPECIFIED TYPE: ICD-10-CM

## 2025-02-11 DIAGNOSIS — Z00.00 WELL ADULT EXAM: Primary | ICD-10-CM

## 2025-02-11 DIAGNOSIS — F41.9 ANXIETY: ICD-10-CM

## 2025-02-11 DIAGNOSIS — J47.9 BRONCHIECTASIS WITHOUT COMPLICATION: ICD-10-CM

## 2025-02-11 DIAGNOSIS — J44.89 COPD WITH ASTHMA: ICD-10-CM

## 2025-02-11 DIAGNOSIS — I10 PRIMARY HYPERTENSION: ICD-10-CM

## 2025-02-11 DIAGNOSIS — E78.2 MIXED HYPERLIPIDEMIA: ICD-10-CM

## 2025-02-11 DIAGNOSIS — E78.00 HYPERCHOLESTEREMIA: ICD-10-CM

## 2025-02-11 DIAGNOSIS — Z23 NEED FOR VACCINATION: ICD-10-CM

## 2025-02-11 DIAGNOSIS — Z12.5 SCREENING FOR PROSTATE CANCER: ICD-10-CM

## 2025-02-11 DIAGNOSIS — Z12.11 SCREEN FOR COLON CANCER: ICD-10-CM

## 2025-02-11 DIAGNOSIS — F32.A DEPRESSIVE DISORDER: ICD-10-CM

## 2025-02-11 DIAGNOSIS — F33.1 MODERATE EPISODE OF RECURRENT MAJOR DEPRESSIVE DISORDER: ICD-10-CM

## 2025-02-11 PROCEDURE — 99999PBSHW PR PBB SHADOW TECHNICAL ONLY FILED TO HB: Mod: PBBFAC,,,

## 2025-02-11 PROCEDURE — 99999 PR PBB SHADOW E&M-EST. PATIENT-LVL III: CPT | Mod: PBBFAC,,, | Performed by: FAMILY MEDICINE

## 2025-02-11 PROCEDURE — 99213 OFFICE O/P EST LOW 20 MIN: CPT | Mod: PBBFAC,PO | Performed by: FAMILY MEDICINE

## 2025-02-11 PROCEDURE — 90472 IMMUNIZATION ADMIN EACH ADD: CPT | Mod: PBBFAC,PO | Performed by: FAMILY MEDICINE

## 2025-02-11 PROCEDURE — 90677 PCV20 VACCINE IM: CPT | Mod: PBBFAC,PO | Performed by: FAMILY MEDICINE

## 2025-02-11 PROCEDURE — 90656 IIV3 VACC NO PRSV 0.5 ML IM: CPT | Mod: PBBFAC,PO | Performed by: FAMILY MEDICINE

## 2025-02-11 PROCEDURE — 90471 IMMUNIZATION ADMIN: CPT | Mod: PBBFAC,PO | Performed by: FAMILY MEDICINE

## 2025-02-11 RX ORDER — ATORVASTATIN CALCIUM 40 MG/1
40 TABLET, FILM COATED ORAL NIGHTLY
Qty: 90 TABLET | Refills: 3 | Status: SHIPPED | OUTPATIENT
Start: 2025-02-11

## 2025-02-11 RX ORDER — BUPROPION HYDROCHLORIDE 150 MG/1
150 TABLET ORAL EVERY MORNING
Qty: 90 TABLET | Refills: 3 | Status: SHIPPED | OUTPATIENT
Start: 2025-02-11

## 2025-02-11 RX ORDER — FLUTICASONE FUROATE, UMECLIDINIUM BROMIDE AND VILANTEROL TRIFENATATE 100; 62.5; 25 UG/1; UG/1; UG/1
1 POWDER RESPIRATORY (INHALATION) DAILY
Qty: 180 EACH | Refills: 4 | Status: SHIPPED | OUTPATIENT
Start: 2025-02-11

## 2025-02-11 RX ORDER — AMLODIPINE BESYLATE 10 MG/1
10 TABLET ORAL DAILY
Qty: 90 TABLET | Refills: 3 | Status: SHIPPED | OUTPATIENT
Start: 2025-02-11 | End: 2026-02-11

## 2025-02-11 RX ORDER — DOXEPIN HYDROCHLORIDE 10 MG/1
10 CAPSULE ORAL NIGHTLY
Qty: 90 CAPSULE | Refills: 3 | Status: SHIPPED | OUTPATIENT
Start: 2025-02-11 | End: 2026-02-11

## 2025-02-11 RX ORDER — ALBUTEROL SULFATE 90 UG/1
2 INHALANT RESPIRATORY (INHALATION) EVERY 4 HOURS PRN
Qty: 18 G | Refills: 11 | Status: SHIPPED | OUTPATIENT
Start: 2025-02-11

## 2025-02-11 RX ADMIN — INFLUENZA VIRUS VACCINE 0.5 ML: 15; 15; 15 SUSPENSION INTRAMUSCULAR at 02:02

## 2025-02-11 RX ADMIN — PNEUMOCOCCAL 20-VALENT CONJUGATE VACCINE 0.5 ML
2.2; 2.2; 2.2; 2.2; 2.2; 2.2; 2.2; 2.2; 2.2; 2.2; 2.2; 2.2; 2.2; 2.2; 2.2; 2.2; 4.4; 2.2; 2.2; 2.2 INJECTION, SUSPENSION INTRAMUSCULAR at 02:02

## 2025-02-11 NOTE — PROGRESS NOTES
"Subjective:       Patient ID: Rusty Carlos is a 64 y.o. male.    Chief Complaint: Follow-up (HTN Follow up)    Patient here today for annual well adult exam.   Immunizations: Due Shingrix #2, RSV and Prevnar  Last Lab Work: 2024  Colon Ca screening: Colonoscopy 2-3 years ago at [facility], FIT 2024  Prostate Ca Screening: PSA 2022    COPD:  seeing Pulm.  He is on Trelegy and Albuterol PRN.  Saw today.    HTN:  He is on Norvasc.  BP is well controlled.  HLD: He is on statin therapy with Lipitor.  Lipids controlled in Jan 2024  MDD:  he is on Wellbutrin and Doxepin.  His mood is good.            Review of Systems   Constitutional:  Negative for appetite change, fatigue and fever.   HENT:  Negative for congestion, sneezing and sore throat.    Respiratory:  Negative for cough, shortness of breath and wheezing.    Cardiovascular:  Negative for chest pain and palpitations.   Gastrointestinal:  Negative for abdominal pain, constipation, diarrhea, nausea and vomiting.   Genitourinary:  Negative for difficulty urinating, dysuria, frequency and hematuria.   Neurological:  Negative for dizziness, syncope, weakness and headaches.   Psychiatric/Behavioral:  Negative for agitation, behavioral problems and confusion. The patient is not nervous/anxious.        Objective:      Vitals:    02/11/25 1408   BP: 132/82   Pulse: 68   Temp: 97.9 °F (36.6 °C)   TempSrc: Oral   SpO2: 97%   Weight: 93.3 kg (205 lb 11 oz)   Height: 6' 2" (1.88 m)      Physical Exam  Constitutional:       General: He is not in acute distress.  Cardiovascular:      Rate and Rhythm: Normal rate and regular rhythm.      Heart sounds: Normal heart sounds. No murmur heard.  Pulmonary:      Effort: Pulmonary effort is normal. No respiratory distress.      Breath sounds: Normal breath sounds. No wheezing, rhonchi or rales.   Skin:     General: Skin is warm and dry.   Neurological:      General: No focal deficit present.      Mental Status: He is alert.   Psychiatric: "         Mood and Affect: Mood normal.         Behavior: Behavior normal.         Thought Content: Thought content normal.            Assessment:       1. Well adult exam    2. Primary hypertension    3. Hypercholesteremia    4. COPD with asthma    5. Moderate episode of recurrent major depressive disorder    6. Screening for prostate cancer    7. Mixed hyperlipidemia    8. Depressive disorder    9. Anxiety    10. Bronchiectasis without complication    11. Cough, unspecified type    12. Need for vaccination    13. Screen for colon cancer        Plan:       Well adult exam  -     CBC Auto Differential; Future; Expected date: 02/11/2025  -     Comprehensive Metabolic Panel; Future; Expected date: 02/11/2025  -     Hemoglobin A1C; Future; Expected date: 02/11/2025  -     Lipid Panel; Future; Expected date: 02/11/2025  -     PSA, Screening; Future; Expected date: 02/11/2025  -     TSH; Future; Expected date: 02/11/2025  -     Urinalysis, Reflex to Urine Culture Urine, Clean Catch; Future; Expected date: 02/11/2025  Continue to work on dietary improvements (decrease overall calorie intake, decrease sugar and carb intake, decrease animal protein intake)  Continue to exercise at least 30-40 minutes, 3 times per week  Immunizations were discussed and flu and prevnar  Preventative exams were discussed and FIT today  Primary hypertension  -     Comprehensive Metabolic Panel; Future; Expected date: 02/11/2025  -     amLODIPine (NORVASC) 10 MG tablet; Take 1 tablet (10 mg total) by mouth once daily.  Dispense: 90 tablet; Refill: 3  Continue Norvasc  Hypercholesteremia  -     Lipid Panel; Future; Expected date: 02/11/2025  Continue statin  COPD with asthma  -     fluticasone-umeclidin-vilanter (TRELEGY ELLIPTA) 100-62.5-25 mcg DsDv; Inhale 1 puff into the lungs once daily.  Dispense: 180 each; Refill: 4  -     albuterol (VENTOLIN HFA) 90 mcg/actuation inhaler; Inhale 2 puffs into the lungs every 4 (four) hours as needed for  Shortness of Breath. Rescue  Dispense: 18 g; Refill: 11  Continue current inhalers  Moderate episode of recurrent major depressive disorder    Screening for prostate cancer  -     PSA, Screening; Future; Expected date: 02/11/2025    Mixed hyperlipidemia  -     atorvastatin (LIPITOR) 40 MG tablet; Take 1 tablet (40 mg total) by mouth every evening.  Dispense: 90 tablet; Refill: 3    Depressive disorder  -     doxepin (SINEQUAN) 10 MG capsule; Take 1 capsule (10 mg total) by mouth every evening.  Dispense: 90 capsule; Refill: 3  -     buPROPion (WELLBUTRIN XL) 150 MG TB24 tablet; Take 1 tablet (150 mg total) by mouth every morning.  Dispense: 90 tablet; Refill: 3    Anxiety  -     doxepin (SINEQUAN) 10 MG capsule; Take 1 capsule (10 mg total) by mouth every evening.  Dispense: 90 capsule; Refill: 3  -     buPROPion (WELLBUTRIN XL) 150 MG TB24 tablet; Take 1 tablet (150 mg total) by mouth every morning.  Dispense: 90 tablet; Refill: 3    Bronchiectasis without complication  -     fluticasone-umeclidin-vilanter (TRELEGY ELLIPTA) 100-62.5-25 mcg DsDv; Inhale 1 puff into the lungs once daily.  Dispense: 180 each; Refill: 4  -     albuterol (VENTOLIN HFA) 90 mcg/actuation inhaler; Inhale 2 puffs into the lungs every 4 (four) hours as needed for Shortness of Breath. Rescue  Dispense: 18 g; Refill: 11    Cough, unspecified type  -     albuterol (VENTOLIN HFA) 90 mcg/actuation inhaler; Inhale 2 puffs into the lungs every 4 (four) hours as needed for Shortness of Breath. Rescue  Dispense: 18 g; Refill: 11    Need for vaccination  -     influenza (Flulaval, Fluzone, Fluarix) 45 mcg/0.5 mL IM vaccine (> or = 6 mo) 0.5 mL  -     pneumoc 20-jesus manuel conj-dip cr(PF) (PREVNAR-20 (PF)) injection Syrg 0.5 mL    Screen for colon cancer  -     Fecal Immunochemical Test (iFOBT); Future; Expected date: 05/11/2025        Visit today included increased complexity associated with the care of the episodic problem HTN addressed and managing the  longitudinal care of the patient due to the serious and/or complex managed problem(s) as above.   Medication List with Changes/Refills   Current Medications    ALBUTEROL (PROVENTIL) 2.5 MG /3 ML (0.083 %) NEBULIZER SOLUTION    Take 3 mLs (2.5 mg total) by nebulization every 6 (six) hours as needed for Wheezing. Rescue    ASPIRIN (ECOTRIN) 81 MG EC TABLET    Take 1 tablet (81 mg total) by mouth once daily.   Changed and/or Refilled Medications    Modified Medication Previous Medication    ALBUTEROL (VENTOLIN HFA) 90 MCG/ACTUATION INHALER albuterol (VENTOLIN HFA) 90 mcg/actuation inhaler       Inhale 2 puffs into the lungs every 4 (four) hours as needed for Shortness of Breath. Rescue    Inhale 2 puffs into the lungs every 4 (four) hours as needed for Shortness of Breath. Rescue    AMLODIPINE (NORVASC) 10 MG TABLET amLODIPine (NORVASC) 10 MG tablet       Take 1 tablet (10 mg total) by mouth once daily.    Take 1 tablet (10 mg total) by mouth once daily.    ATORVASTATIN (LIPITOR) 40 MG TABLET atorvastatin (LIPITOR) 40 MG tablet       Take 1 tablet (40 mg total) by mouth every evening.    Take 1 tablet (40 mg total) by mouth every evening.    BUPROPION (WELLBUTRIN XL) 150 MG TB24 TABLET buPROPion (WELLBUTRIN XL) 150 MG TB24 tablet       Take 1 tablet (150 mg total) by mouth every morning.    Take 1 tablet (150 mg total) by mouth every morning.    DOXEPIN (SINEQUAN) 10 MG CAPSULE doxepin (SINEQUAN) 10 MG capsule       Take 1 capsule (10 mg total) by mouth every evening.    Take 1 capsule (10 mg total) by mouth every evening.    FLUTICASONE-UMECLIDIN-VILANTER (TRELEGY ELLIPTA) 100-62.5-25 MCG DSDV fluticasone-umeclidin-vilanter (TRELEGY ELLIPTA) 100-62.5-25 mcg DsDv       Inhale 1 puff into the lungs once daily.    Inhale 1 puff into the lungs once daily.   Discontinued Medications    PREDNISONE (DELTASONE) 10 MG TABLET    Take one pill a day for three days, repeat for shortness of breath

## 2025-02-12 ENCOUNTER — LAB VISIT (OUTPATIENT)
Dept: LAB | Facility: HOSPITAL | Age: 65
End: 2025-02-12
Attending: FAMILY MEDICINE
Payer: MEDICAID

## 2025-02-12 DIAGNOSIS — Z12.5 SCREENING FOR PROSTATE CANCER: ICD-10-CM

## 2025-02-12 DIAGNOSIS — I10 PRIMARY HYPERTENSION: ICD-10-CM

## 2025-02-12 DIAGNOSIS — Z00.00 WELL ADULT EXAM: ICD-10-CM

## 2025-02-12 LAB
ALBUMIN SERPL BCP-MCNC: 4.3 G/DL (ref 3.5–5.2)
ALP SERPL-CCNC: 102 U/L (ref 40–150)
ALT SERPL W/O P-5'-P-CCNC: 41 U/L (ref 10–44)
ANION GAP SERPL CALC-SCNC: 14 MMOL/L (ref 8–16)
AST SERPL-CCNC: 31 U/L (ref 10–40)
BASOPHILS # BLD AUTO: 0.04 K/UL (ref 0–0.2)
BASOPHILS NFR BLD: 0.4 % (ref 0–1.9)
BILIRUB SERPL-MCNC: 0.4 MG/DL (ref 0.1–1)
BUN SERPL-MCNC: 11 MG/DL (ref 8–23)
CALCIUM SERPL-MCNC: 9.5 MG/DL (ref 8.7–10.5)
CHLORIDE SERPL-SCNC: 103 MMOL/L (ref 95–110)
CO2 SERPL-SCNC: 23 MMOL/L (ref 23–29)
COMPLEXED PSA SERPL-MCNC: 4.8 NG/ML (ref 0–4)
CREAT SERPL-MCNC: 1.1 MG/DL (ref 0.5–1.4)
DIFFERENTIAL METHOD BLD: ABNORMAL
EOSINOPHIL # BLD AUTO: 0.2 K/UL (ref 0–0.5)
EOSINOPHIL NFR BLD: 2.4 % (ref 0–8)
ERYTHROCYTE [DISTWIDTH] IN BLOOD BY AUTOMATED COUNT: 12.4 % (ref 11.5–14.5)
EST. GFR  (NO RACE VARIABLE): >60 ML/MIN/1.73 M^2
ESTIMATED AVG GLUCOSE: 111 MG/DL (ref 68–131)
GLUCOSE SERPL-MCNC: 86 MG/DL (ref 70–110)
HBA1C MFR BLD: 5.5 % (ref 4–5.6)
HCT VFR BLD AUTO: 45.8 % (ref 40–54)
HGB BLD-MCNC: 15.7 G/DL (ref 14–18)
IMM GRANULOCYTES # BLD AUTO: 0.03 K/UL (ref 0–0.04)
IMM GRANULOCYTES NFR BLD AUTO: 0.3 % (ref 0–0.5)
LYMPHOCYTES # BLD AUTO: 1.6 K/UL (ref 1–4.8)
LYMPHOCYTES NFR BLD: 16.3 % (ref 18–48)
MCH RBC QN AUTO: 29.5 PG (ref 27–31)
MCHC RBC AUTO-ENTMCNC: 34.3 G/DL (ref 32–36)
MCV RBC AUTO: 86 FL (ref 82–98)
MONOCYTES # BLD AUTO: 0.8 K/UL (ref 0.3–1)
MONOCYTES NFR BLD: 8 % (ref 4–15)
NEUTROPHILS # BLD AUTO: 7 K/UL (ref 1.8–7.7)
NEUTROPHILS NFR BLD: 72.6 % (ref 38–73)
NRBC BLD-RTO: 0 /100 WBC
PLATELET # BLD AUTO: 340 K/UL (ref 150–450)
PMV BLD AUTO: 10.3 FL (ref 9.2–12.9)
POTASSIUM SERPL-SCNC: 3.9 MMOL/L (ref 3.5–5.1)
PROT SERPL-MCNC: 7.9 G/DL (ref 6–8.4)
RBC # BLD AUTO: 5.33 M/UL (ref 4.6–6.2)
SODIUM SERPL-SCNC: 140 MMOL/L (ref 136–145)
TSH SERPL DL<=0.005 MIU/L-ACNC: 1.04 UIU/ML (ref 0.4–4)
WBC # BLD AUTO: 9.7 K/UL (ref 3.9–12.7)

## 2025-02-12 PROCEDURE — 84443 ASSAY THYROID STIM HORMONE: CPT | Performed by: FAMILY MEDICINE

## 2025-02-12 PROCEDURE — 83036 HEMOGLOBIN GLYCOSYLATED A1C: CPT | Performed by: FAMILY MEDICINE

## 2025-02-12 PROCEDURE — 84153 ASSAY OF PSA TOTAL: CPT | Performed by: FAMILY MEDICINE

## 2025-02-12 PROCEDURE — 85025 COMPLETE CBC W/AUTO DIFF WBC: CPT | Performed by: FAMILY MEDICINE

## 2025-02-12 PROCEDURE — 80053 COMPREHEN METABOLIC PANEL: CPT | Performed by: FAMILY MEDICINE

## 2025-05-13 ENCOUNTER — LAB VISIT (OUTPATIENT)
Dept: LAB | Facility: HOSPITAL | Age: 65
End: 2025-05-13
Attending: FAMILY MEDICINE
Payer: MEDICAID

## 2025-05-13 ENCOUNTER — RESULTS FOLLOW-UP (OUTPATIENT)
Dept: FAMILY MEDICINE | Facility: CLINIC | Age: 65
End: 2025-05-13

## 2025-05-13 DIAGNOSIS — Z12.11 SCREEN FOR COLON CANCER: ICD-10-CM

## 2025-05-13 LAB — OB PNL STL IA: NEGATIVE

## 2025-05-13 PROCEDURE — 82274 ASSAY TEST FOR BLOOD FECAL: CPT

## 2025-08-05 DIAGNOSIS — J44.89 COPD WITH ASTHMA: ICD-10-CM

## 2025-08-05 DIAGNOSIS — J47.9 BRONCHIECTASIS WITHOUT COMPLICATION: ICD-10-CM

## 2025-08-06 RX ORDER — ALBUTEROL SULFATE 0.83 MG/ML
2.5 SOLUTION RESPIRATORY (INHALATION) EVERY 6 HOURS PRN
Qty: 120 ML | Refills: 2 | Status: SHIPPED | OUTPATIENT
Start: 2025-08-06 | End: 2026-08-06

## 2025-08-12 ENCOUNTER — OFFICE VISIT (OUTPATIENT)
Dept: FAMILY MEDICINE | Facility: CLINIC | Age: 65
End: 2025-08-12
Payer: MEDICARE

## 2025-08-12 VITALS
TEMPERATURE: 97 F | HEART RATE: 78 BPM | WEIGHT: 205.94 LBS | SYSTOLIC BLOOD PRESSURE: 106 MMHG | OXYGEN SATURATION: 98 % | BODY MASS INDEX: 26.43 KG/M2 | HEIGHT: 74 IN | DIASTOLIC BLOOD PRESSURE: 62 MMHG | RESPIRATION RATE: 18 BRPM

## 2025-08-12 DIAGNOSIS — Z12.5 SCREENING FOR PROSTATE CANCER: ICD-10-CM

## 2025-08-12 DIAGNOSIS — F33.1 MODERATE EPISODE OF RECURRENT MAJOR DEPRESSIVE DISORDER: ICD-10-CM

## 2025-08-12 DIAGNOSIS — I10 PRIMARY HYPERTENSION: Primary | ICD-10-CM

## 2025-08-12 DIAGNOSIS — Z00.00 WELL ADULT EXAM: ICD-10-CM

## 2025-08-12 DIAGNOSIS — J44.89 COPD WITH ASTHMA: ICD-10-CM

## 2025-08-12 DIAGNOSIS — E78.00 HYPERCHOLESTEREMIA: ICD-10-CM

## 2025-08-12 PROCEDURE — 1159F MED LIST DOCD IN RCRD: CPT | Mod: CPTII,S$GLB,, | Performed by: FAMILY MEDICINE

## 2025-08-12 PROCEDURE — 3044F HG A1C LEVEL LT 7.0%: CPT | Mod: CPTII,S$GLB,, | Performed by: FAMILY MEDICINE

## 2025-08-12 PROCEDURE — 99999 PR PBB SHADOW E&M-EST. PATIENT-LVL V: CPT | Mod: PBBFAC,,, | Performed by: FAMILY MEDICINE

## 2025-08-12 PROCEDURE — 1160F RVW MEDS BY RX/DR IN RCRD: CPT | Mod: CPTII,S$GLB,, | Performed by: FAMILY MEDICINE

## 2025-08-12 PROCEDURE — 3078F DIAST BP <80 MM HG: CPT | Mod: CPTII,S$GLB,, | Performed by: FAMILY MEDICINE

## 2025-08-12 PROCEDURE — 99214 OFFICE O/P EST MOD 30 MIN: CPT | Mod: S$GLB,,, | Performed by: FAMILY MEDICINE

## 2025-08-12 PROCEDURE — G2211 COMPLEX E/M VISIT ADD ON: HCPCS | Mod: S$GLB,,, | Performed by: FAMILY MEDICINE

## 2025-08-12 PROCEDURE — 3074F SYST BP LT 130 MM HG: CPT | Mod: CPTII,S$GLB,, | Performed by: FAMILY MEDICINE

## 2025-08-12 PROCEDURE — 3008F BODY MASS INDEX DOCD: CPT | Mod: CPTII,S$GLB,, | Performed by: FAMILY MEDICINE
